# Patient Record
Sex: MALE | Race: ASIAN | NOT HISPANIC OR LATINO
[De-identification: names, ages, dates, MRNs, and addresses within clinical notes are randomized per-mention and may not be internally consistent; named-entity substitution may affect disease eponyms.]

---

## 2020-12-07 ENCOUNTER — TRANSCRIPTION ENCOUNTER (OUTPATIENT)
Age: 45
End: 2020-12-07

## 2021-01-15 ENCOUNTER — TRANSCRIPTION ENCOUNTER (OUTPATIENT)
Age: 46
End: 2021-01-15

## 2021-11-30 ENCOUNTER — TRANSCRIPTION ENCOUNTER (OUTPATIENT)
Age: 46
End: 2021-11-30

## 2022-01-22 ENCOUNTER — TRANSCRIPTION ENCOUNTER (OUTPATIENT)
Age: 47
End: 2022-01-22

## 2022-07-23 ENCOUNTER — NON-APPOINTMENT (OUTPATIENT)
Age: 47
End: 2022-07-23

## 2022-12-15 ENCOUNTER — NON-APPOINTMENT (OUTPATIENT)
Age: 47
End: 2022-12-15

## 2023-02-06 ENCOUNTER — NON-APPOINTMENT (OUTPATIENT)
Age: 48
End: 2023-02-06

## 2024-02-08 ENCOUNTER — INPATIENT (INPATIENT)
Facility: HOSPITAL | Age: 49
LOS: 18 days | Discharge: HOME | End: 2024-02-27
Attending: PSYCHIATRY & NEUROLOGY | Admitting: PSYCHIATRY & NEUROLOGY
Payer: MEDICAID

## 2024-02-08 ENCOUNTER — OUTPATIENT (OUTPATIENT)
Dept: OUTPATIENT SERVICES | Facility: HOSPITAL | Age: 49
LOS: 1 days | Discharge: STILL ADMITTED OR ADMINISTRATIVE | End: 2024-02-08

## 2024-02-08 VITALS
TEMPERATURE: 98 F | OXYGEN SATURATION: 99 % | SYSTOLIC BLOOD PRESSURE: 125 MMHG | HEART RATE: 83 BPM | RESPIRATION RATE: 18 BRPM | DIASTOLIC BLOOD PRESSURE: 91 MMHG

## 2024-02-08 DIAGNOSIS — F10.90 ALCOHOL USE, UNSPECIFIED, UNCOMPLICATED: ICD-10-CM

## 2024-02-08 DIAGNOSIS — F39 UNSPECIFIED MOOD [AFFECTIVE] DISORDER: ICD-10-CM

## 2024-02-08 DIAGNOSIS — F12.90 CANNABIS USE, UNSPECIFIED, UNCOMPLICATED: ICD-10-CM

## 2024-02-08 LAB
ALBUMIN SERPL ELPH-MCNC: 4.2 G/DL — SIGNIFICANT CHANGE UP (ref 3.3–5)
ALP SERPL-CCNC: 68 U/L — SIGNIFICANT CHANGE UP (ref 40–120)
ALT FLD-CCNC: 72 U/L — HIGH (ref 4–41)
AMPHET UR-MCNC: NEGATIVE — SIGNIFICANT CHANGE UP
ANION GAP SERPL CALC-SCNC: 13 MMOL/L — SIGNIFICANT CHANGE UP (ref 7–14)
APAP SERPL-MCNC: <10 UG/ML — LOW (ref 15–25)
APPEARANCE UR: CLEAR — SIGNIFICANT CHANGE UP
AST SERPL-CCNC: 30 U/L — SIGNIFICANT CHANGE UP (ref 4–40)
BARBITURATES UR SCN-MCNC: NEGATIVE — SIGNIFICANT CHANGE UP
BASOPHILS # BLD AUTO: 0.04 K/UL — SIGNIFICANT CHANGE UP (ref 0–0.2)
BASOPHILS NFR BLD AUTO: 0.4 % — SIGNIFICANT CHANGE UP (ref 0–2)
BENZODIAZ UR-MCNC: NEGATIVE — SIGNIFICANT CHANGE UP
BILIRUB SERPL-MCNC: 0.3 MG/DL — SIGNIFICANT CHANGE UP (ref 0.2–1.2)
BILIRUB UR-MCNC: NEGATIVE — SIGNIFICANT CHANGE UP
BUN SERPL-MCNC: 21 MG/DL — SIGNIFICANT CHANGE UP (ref 7–23)
CALCIUM SERPL-MCNC: 9.4 MG/DL — SIGNIFICANT CHANGE UP (ref 8.4–10.5)
CHLORIDE SERPL-SCNC: 102 MMOL/L — SIGNIFICANT CHANGE UP (ref 98–107)
CO2 SERPL-SCNC: 24 MMOL/L — SIGNIFICANT CHANGE UP (ref 22–31)
COCAINE METAB.OTHER UR-MCNC: NEGATIVE — SIGNIFICANT CHANGE UP
COLOR SPEC: YELLOW — SIGNIFICANT CHANGE UP
CREAT SERPL-MCNC: 0.95 MG/DL — SIGNIFICANT CHANGE UP (ref 0.5–1.3)
CREATININE URINE RESULT, DAU: 148 MG/DL — SIGNIFICANT CHANGE UP
DIFF PNL FLD: NEGATIVE — SIGNIFICANT CHANGE UP
EGFR: 100 ML/MIN/1.73M2 — SIGNIFICANT CHANGE UP
EOSINOPHIL # BLD AUTO: 0.08 K/UL — SIGNIFICANT CHANGE UP (ref 0–0.5)
EOSINOPHIL NFR BLD AUTO: 0.7 % — SIGNIFICANT CHANGE UP (ref 0–6)
ETHANOL SERPL-MCNC: <10 MG/DL — SIGNIFICANT CHANGE UP
FLUAV AG NPH QL: SIGNIFICANT CHANGE UP
FLUBV AG NPH QL: SIGNIFICANT CHANGE UP
GLUCOSE SERPL-MCNC: 86 MG/DL — SIGNIFICANT CHANGE UP (ref 70–99)
GLUCOSE UR QL: NEGATIVE MG/DL — SIGNIFICANT CHANGE UP
HCT VFR BLD CALC: 46.6 % — SIGNIFICANT CHANGE UP (ref 39–50)
HGB BLD-MCNC: 15.2 G/DL — SIGNIFICANT CHANGE UP (ref 13–17)
IANC: 7.8 K/UL — HIGH (ref 1.8–7.4)
IMM GRANULOCYTES NFR BLD AUTO: 0.5 % — SIGNIFICANT CHANGE UP (ref 0–0.9)
KETONES UR-MCNC: NEGATIVE MG/DL — SIGNIFICANT CHANGE UP
LEUKOCYTE ESTERASE UR-ACNC: NEGATIVE — SIGNIFICANT CHANGE UP
LYMPHOCYTES # BLD AUTO: 19.6 % — SIGNIFICANT CHANGE UP (ref 13–44)
LYMPHOCYTES # BLD AUTO: 2.1 K/UL — SIGNIFICANT CHANGE UP (ref 1–3.3)
MCHC RBC-ENTMCNC: 25.1 PG — LOW (ref 27–34)
MCHC RBC-ENTMCNC: 32.6 GM/DL — SIGNIFICANT CHANGE UP (ref 32–36)
MCV RBC AUTO: 77 FL — LOW (ref 80–100)
METHADONE UR-MCNC: NEGATIVE — SIGNIFICANT CHANGE UP
MONOCYTES # BLD AUTO: 0.66 K/UL — SIGNIFICANT CHANGE UP (ref 0–0.9)
MONOCYTES NFR BLD AUTO: 6.2 % — SIGNIFICANT CHANGE UP (ref 2–14)
NEUTROPHILS # BLD AUTO: 7.8 K/UL — HIGH (ref 1.8–7.4)
NEUTROPHILS NFR BLD AUTO: 72.6 % — SIGNIFICANT CHANGE UP (ref 43–77)
NITRITE UR-MCNC: NEGATIVE — SIGNIFICANT CHANGE UP
NRBC # BLD: 0 /100 WBCS — SIGNIFICANT CHANGE UP (ref 0–0)
NRBC # FLD: 0 K/UL — SIGNIFICANT CHANGE UP (ref 0–0)
OPIATES UR-MCNC: NEGATIVE — SIGNIFICANT CHANGE UP
OXYCODONE UR-MCNC: NEGATIVE — SIGNIFICANT CHANGE UP
PCP SPEC-MCNC: SIGNIFICANT CHANGE UP
PCP UR-MCNC: NEGATIVE — SIGNIFICANT CHANGE UP
PH UR: 7.5 — SIGNIFICANT CHANGE UP (ref 5–8)
PLATELET # BLD AUTO: 289 K/UL — SIGNIFICANT CHANGE UP (ref 150–400)
POTASSIUM SERPL-MCNC: 4.6 MMOL/L — SIGNIFICANT CHANGE UP (ref 3.5–5.3)
POTASSIUM SERPL-SCNC: 4.6 MMOL/L — SIGNIFICANT CHANGE UP (ref 3.5–5.3)
PROT SERPL-MCNC: 7.3 G/DL — SIGNIFICANT CHANGE UP (ref 6–8.3)
PROT UR-MCNC: NEGATIVE MG/DL — SIGNIFICANT CHANGE UP
RBC # BLD: 6.05 M/UL — HIGH (ref 4.2–5.8)
RBC # FLD: 14.3 % — SIGNIFICANT CHANGE UP (ref 10.3–14.5)
RSV RNA NPH QL NAA+NON-PROBE: SIGNIFICANT CHANGE UP
SALICYLATES SERPL-MCNC: <0.3 MG/DL — LOW (ref 15–30)
SARS-COV-2 RNA SPEC QL NAA+PROBE: SIGNIFICANT CHANGE UP
SODIUM SERPL-SCNC: 139 MMOL/L — SIGNIFICANT CHANGE UP (ref 135–145)
SP GR SPEC: 1.02 — SIGNIFICANT CHANGE UP (ref 1–1.03)
THC UR QL: POSITIVE
TOXICOLOGY SCREEN, DRUGS OF ABUSE, SERUM RESULT: SIGNIFICANT CHANGE UP
TSH SERPL-MCNC: 0.57 UIU/ML — SIGNIFICANT CHANGE UP (ref 0.27–4.2)
UROBILINOGEN FLD QL: 0.2 MG/DL — SIGNIFICANT CHANGE UP (ref 0.2–1)
WBC # BLD: 10.73 K/UL — HIGH (ref 3.8–10.5)
WBC # FLD AUTO: 10.73 K/UL — HIGH (ref 3.8–10.5)

## 2024-02-08 PROCEDURE — G1004: CPT

## 2024-02-08 PROCEDURE — 70450 CT HEAD/BRAIN W/O DYE: CPT | Mod: 26,ME

## 2024-02-08 PROCEDURE — 99285 EMERGENCY DEPT VISIT HI MDM: CPT

## 2024-02-08 RX ORDER — ATORVASTATIN CALCIUM 80 MG/1
20 TABLET, FILM COATED ORAL AT BEDTIME
Refills: 0 | Status: DISCONTINUED | OUTPATIENT
Start: 2024-02-08 | End: 2024-02-27

## 2024-02-08 RX ORDER — THIAMINE MONONITRATE (VIT B1) 100 MG
100 TABLET ORAL DAILY
Refills: 0 | Status: DISCONTINUED | OUTPATIENT
Start: 2024-02-08 | End: 2024-02-23

## 2024-02-08 RX ORDER — CLOPIDOGREL BISULFATE 75 MG/1
75 TABLET, FILM COATED ORAL DAILY
Refills: 0 | Status: DISCONTINUED | OUTPATIENT
Start: 2024-02-08 | End: 2024-02-27

## 2024-02-08 RX ORDER — METOPROLOL TARTRATE 50 MG
25 TABLET ORAL DAILY
Refills: 0 | Status: DISCONTINUED | OUTPATIENT
Start: 2024-02-08 | End: 2024-02-27

## 2024-02-08 RX ORDER — SERTRALINE 25 MG/1
1 TABLET, FILM COATED ORAL
Refills: 0 | DISCHARGE

## 2024-02-08 RX ORDER — INFLUENZA VIRUS VACCINE 15; 15; 15; 15 UG/.5ML; UG/.5ML; UG/.5ML; UG/.5ML
0.5 SUSPENSION INTRAMUSCULAR ONCE
Refills: 0 | Status: DISCONTINUED | OUTPATIENT
Start: 2024-02-08 | End: 2024-02-27

## 2024-02-08 RX ORDER — FOLIC ACID 0.8 MG
1 TABLET ORAL DAILY
Refills: 0 | Status: DISCONTINUED | OUTPATIENT
Start: 2024-02-08 | End: 2024-02-23

## 2024-02-08 NOTE — ED BEHAVIORAL HEALTH ASSESSMENT NOTE - NSPRESENTSXS_PSY_ALL_CORE
Impulsivity/Severe anxiety, agitation or panic ? Psychotic- grandiose/Impulsivity/Severe anxiety, agitation or panic

## 2024-02-08 NOTE — ED BEHAVIORAL HEALTH ASSESSMENT NOTE - ORAL MEDICATION DETAILS
Area H Indication Text: Tumors in this location are included in Area H (eyelids, eyebrows, nose, lips, chin, ear, pre-auricular, post-auricular, temple, genitalia, hands, feet, ankles and areola).  Tissue conservation is critical in these anatomic locations. see above
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
## 2024-02-08 NOTE — ED BEHAVIORAL HEALTH ASSESSMENT NOTE - RISK ASSESSMENT
modifiable risk factors- substance use, agitation/aggression at home, ? grandiosity, poor insight/judgment, insomnia, impulsive bx    protective factors- no suicide attempts, no SI/HI/SIB/intent/plan, no inpatient admissions, no legal issues

## 2024-02-08 NOTE — ED BEHAVIORAL HEALTH ASSESSMENT NOTE - OTHER PAST PSYCHIATRIC HISTORY (INCLUDE DETAILS REGARDING ONSET, COURSE OF ILLNESS, INPATIENT/OUTPATIENT TREATMENT)
No formal psychiatric history or inpatient admissions. No suicide attempts. No current outpatient treaters.

## 2024-02-08 NOTE — BH PATIENT PROFILE - HOME MEDICATIONS
Metoprolol Succinate ER 25 mg oral tablet, extended release , 1 tab(s) orally once a day  atorvastatin 20 mg oral tablet , 1 tab(s) orally once a day (at bedtime)  clopidogrel 75 mg oral tablet , 1 tab(s) orally once a day

## 2024-02-08 NOTE — ED BEHAVIORAL HEALTH ASSESSMENT NOTE - NSACTIVEVENT_PSY_ALL_CORE
Triggering events leading to humiliation, shame, and/or despair (e.g., Loss of relationship, financial or health status) (real or anticipated)/Current or pending social isolation/Pending incarceration or homelessness/Recent onset of psychiatric illness

## 2024-02-08 NOTE — BH PATIENT PROFILE - NSVRISKSCORE_PSY_ALL_CORE
no NLF on rest, first time patient smiled no facial asymmetry, second time some difference but not full effort 2

## 2024-02-08 NOTE — ED BEHAVIORAL HEALTH ASSESSMENT NOTE - VIOLENCE RISK FACTORS:
Violent ideation/threat/speech/Impulsivity/Lack of insight into violence risk/need for treatment/Community stressors that increase the risk of destabilization/Firearm/weapon access

## 2024-02-08 NOTE — ED BEHAVIORAL HEALTH ASSESSMENT NOTE - NSBHSAALC_PSY_A_CORE FT
previously drinking daily 1 350ml bottle of liquor; stated he has not drank since leaving [place] detox but did drink 1 bottle last night 350ml Bicardi rum
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
## 2024-02-08 NOTE — ED BEHAVIORAL HEALTH ASSESSMENT NOTE - CURRENT MEDICATION
Zoloft 50mg Daily, metroprolol ER 50mg, clopidogrel 75mg, atrovastatin 25mg Zoloft 25mg Daily, metroprolol ER 50mg, clopidogrel 75mg, atrovastatin 25mg
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
## 2024-02-08 NOTE — ED BEHAVIORAL HEALTH ASSESSMENT NOTE - DESCRIPTION
congenital heart disease, htn During course of ED visit patient was cooperative. Patient was not aggressive or violent and did not require PRN medications.    ICU Vital Signs Last 24 Hrs  T(C): 36.7 (08 Feb 2024 13:57), Max: 36.7 (08 Feb 2024 13:57)  T(F): 98 (08 Feb 2024 13:57), Max: 98 (08 Feb 2024 13:57)  HR: 83 (08 Feb 2024 13:57) (83 - 83)  BP: 125/91 (08 Feb 2024 13:57) (125/91 - 125/91)  BP(mean): --  ABP: --  ABP(mean): --  RR: 18 (08 Feb 2024 13:57) (18 - 18)  SpO2: 99% (08 Feb 2024 13:57) (99% - 99%)    O2 Parameters below as of 08 Feb 2024 13:57  Patient On (Oxygen Delivery Method): room air 48 year old, recently  from wife and living with family. Previously employed as a ,  BS in Biology and minor in computer science. Born in [facility] During course of ED visit patient was cooperative. Patient was not aggressive or violent . Recommended CIWA to EM    ICU Vital Signs Last 24 Hrs  T(C): 36.7 (08 Feb 2024 13:57), Max: 36.7 (08 Feb 2024 13:57)  T(F): 98 (08 Feb 2024 13:57), Max: 98 (08 Feb 2024 13:57)  HR: 83 (08 Feb 2024 13:57) (83 - 83)  BP: 125/91 (08 Feb 2024 13:57) (125/91 - 125/91)  BP(mean): --  ABP: --  ABP(mean): --  RR: 18 (08 Feb 2024 13:57) (18 - 18)  SpO2: 99% (08 Feb 2024 13:57) (99% - 99%)    O2 Parameters below as of 08 Feb 2024 13:57  Patient On (Oxygen Delivery Method): room air During course of ED visit patient was cooperative. Patient was not aggressive or violent . Recommended CIWA to EM- given ativan 2mg PO at 19:11    ICU Vital Signs Last 24 Hrs  T(C): 36.7 (08 Feb 2024 13:57), Max: 36.7 (08 Feb 2024 13:57)  T(F): 98 (08 Feb 2024 13:57), Max: 98 (08 Feb 2024 13:57)  HR: 83 (08 Feb 2024 13:57) (83 - 83)  BP: 125/91 (08 Feb 2024 13:57) (125/91 - 125/91)  BP(mean): --  ABP: --  ABP(mean): --  RR: 18 (08 Feb 2024 13:57) (18 - 18)  SpO2: 99% (08 Feb 2024 13:57) (99% - 99%)    O2 Parameters below as of 08 Feb 2024 13:57  Patient On (Oxygen Delivery Method): room air

## 2024-02-08 NOTE — ED BEHAVIORAL HEALTH ASSESSMENT NOTE - NSSUICPROTFACT_PSY_ALL_CORE
Responsibility to children, family, or others/Identifies reasons for living/Cultural, spiritual and/or moral attitudes against suicide/Jehovah's witness beliefs
No

## 2024-02-08 NOTE — ED BEHAVIORAL HEALTH ASSESSMENT NOTE - NSBHATTESTAPPAMEND_PSY_A_CORE
I have personally seen and examined this patient. I fully participated in the care of this patient. I have made amendments to the documentation where appropriate and otherwise agree with the history, physical exam, and plan as documented by the TASH
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
## 2024-02-08 NOTE — ED PROVIDER NOTE - NSFOLLOWUPINSTRUCTIONS_ED_ALL_ED_FT
Follow up with your PMD within 48-72 hrs. Show copies of your reports given to you.   Worsening, continued or new concerning symptoms return to the emergency department.    You have been given information necessary to follow up with the  [facility] ([facility]) Crisis center & other outpatient  psychiatric clinics within your community    • [facility] walk in Crisis centre  [address] [place] [address]  [phone] [email or web address]  Hours of operation:  Day	                                        Hours  Sunday                                  Closed  Monday                                9am - 3pm  Tuesday                                9am - 3pm  Wednesday                          9am - 3pm  Thursday                               9am - 3pm  Friday                                    9am - 3pm  Saturday                                Closed

## 2024-02-08 NOTE — ED ADULT TRIAGE NOTE - CHIEF COMPLAINT QUOTE
sent from crisis center, went to seek help but became agitated with staff, calm and cooperative at this time, denies S/I, H/I, H/A, A/H
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
## 2024-02-08 NOTE — BH PATIENT PROFILE - FALL HARM RISK - CONCLUSION
Universal Safety Interventions I personally performed the service described in the documentation recorded by the scribe in my presence, and it accurately and completely records my words and actions.

## 2024-02-08 NOTE — ED BEHAVIORAL HEALTH ASSESSMENT NOTE - PSYCHIATRIC ISSUES AND PLAN (INCLUDE STANDING AND PRN MEDICATION)
defer to inpatient team; Haldol 5mg PO/IM PRN, Ativan 2mg IM PRN, Hydroxyzine HCL 50mg PRN, Benadryl 50mg PO IM PRN stop zoloft- unclear if causing agitation/irritability; defer to inpatient team; Haldol 5mg PO/IM PRN, Ativan 2mg IM PRN, Hydroxyzine HCL 50mg PRN, Benadryl 50mg PO IM PRN
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
## 2024-02-08 NOTE — ED BEHAVIORAL HEALTH ASSESSMENT NOTE - VETERAN
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 Currently Listed for OLT     #Decompensated ETOH/PARNELL Cirrhosis, listed for OLT  #Abdominal collection, infected hematoma s/p IR drainage x 2 with C tube repositioning c/b Augmentin resistant E coli  #Serratia bacteremia 9/11, repeat BCx 9/15 negative  #Hyponatremia  MELD-Na: 24, 9/21  - HE: none currently  - EV: normal esophagus on ERCP from 7/2022  - Ascites: moderate 8/31 US  - SBP: paracentesis 8/9/2022 reveals likely secondary bacterial peritonitis from suspected gallbladder pathology, +SBP on para 8/31  - HCC:  No lesions on CT 8/26      Plan  - Continue lasix  60 mg/aldactone 150  - Check CTAP with IV con, portal venous phase  - F/u ID: long term plan for Abx  - serratia sens to cefepime  - On cefepime, metronidazole  - ID on board, appreciate recs  - ASA 81 daily  - Drain management as per IR RUQ drain output 10ml.24 hours ->removed 9/19  - Repeat CTAP later this week  - Daily CMP, CBC, coags  - Continue Fluconazole  - Physical therapy  [x] Psychosocial: cleared pending RPP  [x ] Infectious  [x] Cardiology:        Cardiac cath: n/a       Stress test: negative noninvasive stress test on 8/19/2022 for inducible ischemia  [ ] Colonoscopy: needed, f/u records from outside GI colonoscopy reports  [x] Prostate: Prostate Specific Antigen: negative at 2.21 ng/mL on 8/12/2022  [x] Dental  [x] PT/Frailty    Preliminary note until signed by Attending.    Thank you for involving us in this patient's care.    Kary Tena MD  Gastroenterology/Hepatology Fellow, PGY-VI    NON-URGENT CONSULTS:  Please email [email or web address]  AT NIGHT AND ON WEEKENDS:  Contact on-call GI fellow via answering service ([phone]) from 5pm-8am and on weekends/holidays  MONDAY-FRIDAY 8AM-5PM:  Pager# [phone] ([facility])  GI Phone# [phone] ([facility])     No

## 2024-02-09 PROCEDURE — 90832 PSYTX W PT 30 MINUTES: CPT | Mod: 59

## 2024-02-09 PROCEDURE — 90853 GROUP PSYCHOTHERAPY: CPT

## 2024-02-09 PROCEDURE — 99222 1ST HOSP IP/OBS MODERATE 55: CPT

## 2024-02-09 RX ORDER — LAMOTRIGINE 25 MG/1
25 TABLET, ORALLY DISINTEGRATING ORAL DAILY
Refills: 0 | Status: DISCONTINUED | OUTPATIENT
Start: 2024-02-09 | End: 2024-02-27

## 2024-02-09 RX ORDER — PHENYLEPHRINE-SHARK LIVER OIL-MINERAL OIL-PETROLATUM RECTAL OINTMENT
1 OINTMENT (GRAM) RECTAL
Refills: 0 | Status: DISCONTINUED | OUTPATIENT
Start: 2024-02-09 | End: 2024-02-27

## 2024-02-09 RX ORDER — POLYETHYLENE GLYCOL 3350 17 G/17G
17 POWDER, FOR SOLUTION ORAL
Refills: 0 | Status: DISCONTINUED | OUTPATIENT
Start: 2024-02-09 | End: 2024-02-27

## 2024-02-09 RX ORDER — OLANZAPINE 15 MG/1
2.5 TABLET, FILM COATED ORAL AT BEDTIME
Refills: 0 | Status: DISCONTINUED | OUTPATIENT
Start: 2024-02-09 | End: 2024-02-11

## 2024-02-09 RX ORDER — LANOLIN ALCOHOL/MO/W.PET/CERES
3 CREAM (GRAM) TOPICAL AT BEDTIME
Refills: 0 | Status: DISCONTINUED | OUTPATIENT
Start: 2024-02-09 | End: 2024-02-27

## 2024-02-09 RX ORDER — HALOPERIDOL DECANOATE 100 MG/ML
5 INJECTION INTRAMUSCULAR EVERY 6 HOURS
Refills: 0 | Status: DISCONTINUED | OUTPATIENT
Start: 2024-02-09 | End: 2024-02-15

## 2024-02-09 RX ADMIN — OLANZAPINE 2.5 MILLIGRAM(S): 15 TABLET, FILM COATED ORAL at 20:35

## 2024-02-09 RX ADMIN — Medication 3 MILLIGRAM(S): at 02:22

## 2024-02-09 RX ADMIN — Medication 100 MILLIGRAM(S): at 09:33

## 2024-02-09 RX ADMIN — Medication 1 MILLIGRAM(S): at 09:32

## 2024-02-09 RX ADMIN — Medication 25 MILLIGRAM(S): at 09:33

## 2024-02-09 RX ADMIN — Medication 1 TABLET(S): at 09:33

## 2024-02-09 RX ADMIN — POLYETHYLENE GLYCOL 3350 17 GRAM(S): 17 POWDER, FOR SOLUTION ORAL at 20:35

## 2024-02-09 RX ADMIN — ATORVASTATIN CALCIUM 20 MILLIGRAM(S): 80 TABLET, FILM COATED ORAL at 20:35

## 2024-02-09 RX ADMIN — CLOPIDOGREL BISULFATE 75 MILLIGRAM(S): 75 TABLET, FILM COATED ORAL at 09:33
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
## 2024-02-09 NOTE — BH INPATIENT PSYCHIATRY ASSESSMENT NOTE - NSDCCRITERIA_PSY_ALL_CORE
Once patient is no longer a threat to himself or others and is able to take care of himself.  When patient is no longer an acute or imminent risk of harm to self or others, and is able to care for self safely
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
## 2024-02-09 NOTE — BH INPATIENT PSYCHIATRY ASSESSMENT NOTE - NSSUICPROTFACT_PSY_ALL_CORE
Responsibility to children, family, or others/Identifies reasons for living/Cultural, spiritual and/or moral attitudes against suicide/Sikh beliefs
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
## 2024-02-09 NOTE — BH INPATIENT PSYCHIATRY ASSESSMENT NOTE - NSCOMMENTSUICRISKFACT_PSY_ALL_CORE
recent stressors include separation from wife and daughter, loss of job, increased alcohol use, and eviction from house

## 2024-02-09 NOTE — BH INPATIENT PSYCHIATRY ASSESSMENT NOTE - MSE UNSTRUCTURED FT
Appearance: well-groomed, dressed appropriately, appears stated age  Behavior: calm and cooperative, no psychomotor agitation or slowing, no abnormal movements  Eye contact: good eye contact  Speech: rapid speech, appropriate volume and tone  Mood: "depressed"  Affect: sad, congruent with mood  Thought process: linear, goal-oriented  Thought content: denies suicidal and homicidal ideation/intent/plan  Perception: denies   Cognition: A&O x3  Insight: good  Judgement: fair  Impulse control: good   Appearance: well-groomed, dressed appropriately, appears stated age  Behavior: calm and cooperative, no psychomotor agitation or slowing, no abnormal movements  Eye contact: good eye contact  Speech: rapid speech, appropriate volume and tone  Mood: "depressed"  Affect: euthymic  Thought process: linear, goal-oriented  Thought content: denies suicidal and homicidal ideation/intent/plan  Perception: denies auditory or visual hallucinations  Cognition: A&O x3  Insight: good  Judgement: fair  Impulse control: good

## 2024-02-09 NOTE — BH INPATIENT PSYCHIATRY ASSESSMENT NOTE - NSBHCHARTREVIEWVS_PSY_A_CORE FT
Vital Signs Last 24 Hrs  T(C): 36.3 (02-09-24 @ 07:44), Max: 36.7 (02-08-24 @ 13:57)  T(F): 97.3 (02-09-24 @ 07:44), Max: 98.1 (02-08-24 @ 20:28)  HR: 61 (02-08-24 @ 20:28) (61 - 83)  BP: 126/83 (02-08-24 @ 20:28) (125/91 - 126/83)  BP(mean): --  RR: 18 (02-08-24 @ 21:31) (17 - 18)  SpO2: 99% (02-08-24 @ 21:31) (99% - 99%)    Orthostatic VS  02-09-24 @ 07:44  Lying BP: --/-- HR: --  Sitting BP: 122/78 HR: 60  Standing BP: 125/88 HR: 61  Site: --  Mode: --  Orthostatic VS  02-08-24 @ 21:31  Lying BP: --/-- HR: --  Sitting BP: 127/86 HR: 66  Standing BP: 126/76 HR: 64  Site: --  Mode: --   Vital Signs Last 24 Hrs  T(C): 36.3 (02-09-24 @ 07:44), Max: 36.7 (02-08-24 @ 20:28)  T(F): 97.3 (02-09-24 @ 07:44), Max: 98.1 (02-08-24 @ 20:28)  HR: 61 (02-08-24 @ 20:28) (61 - 61)  BP: 126/83 (02-08-24 @ 20:28) (126/83 - 126/83)  BP(mean): --  RR: 18 (02-08-24 @ 21:31) (17 - 18)  SpO2: 99% (02-08-24 @ 21:31) (99% - 99%)    Orthostatic VS  02-09-24 @ 07:44  Lying BP: --/-- HR: --  Sitting BP: 122/78 HR: 60  Standing BP: 125/88 HR: 61  Site: --  Mode: --  Orthostatic VS  02-08-24 @ 21:31  Lying BP: --/-- HR: --  Sitting BP: 127/86 HR: 66  Standing BP: 126/76 HR: 64  Site: --  Mode: --

## 2024-02-09 NOTE — BH INPATIENT PSYCHIATRY ASSESSMENT NOTE - NSBHMETABOLIC_PSY_ALL_CORE_FT
BMI: BMI (kg/m2): 25.8 (02-08-24 @ 21:31)  HbA1c:   Glucose:   BP: 126/83 (02-08-24 @ 20:28) (125/91 - 126/83)Vital Signs Last 24 Hrs  T(C): 36.3 (02-09-24 @ 07:44), Max: 36.7 (02-08-24 @ 13:57)  T(F): 97.3 (02-09-24 @ 07:44), Max: 98.1 (02-08-24 @ 20:28)  HR: 61 (02-08-24 @ 20:28) (61 - 83)  BP: 126/83 (02-08-24 @ 20:28) (125/91 - 126/83)  BP(mean): --  RR: 18 (02-08-24 @ 21:31) (17 - 18)  SpO2: 99% (02-08-24 @ 21:31) (99% - 99%)    Orthostatic VS  02-09-24 @ 07:44  Lying BP: --/-- HR: --  Sitting BP: 122/78 HR: 60  Standing BP: 125/88 HR: 61  Site: --  Mode: --  Orthostatic VS  02-08-24 @ 21:31  Lying BP: --/-- HR: --  Sitting BP: 127/86 HR: 66  Standing BP: 126/76 HR: 64  Site: --  Mode: --    Lipid Panel:  BMI: BMI (kg/m2): 25.8 (02-08-24 @ 21:31)  HbA1c:   Glucose:   BP: 126/83 (02-08-24 @ 20:28) (125/91 - 126/83)Vital Signs Last 24 Hrs  T(C): 36.3 (02-09-24 @ 07:44), Max: 36.7 (02-08-24 @ 20:28)  T(F): 97.3 (02-09-24 @ 07:44), Max: 98.1 (02-08-24 @ 20:28)  HR: 61 (02-08-24 @ 20:28) (61 - 61)  BP: 126/83 (02-08-24 @ 20:28) (126/83 - 126/83)  BP(mean): --  RR: 18 (02-08-24 @ 21:31) (17 - 18)  SpO2: 99% (02-08-24 @ 21:31) (99% - 99%)    Orthostatic VS  02-09-24 @ 07:44  Lying BP: --/-- HR: --  Sitting BP: 122/78 HR: 60  Standing BP: 125/88 HR: 61  Site: --  Mode: --  Orthostatic VS  02-08-24 @ 21:31  Lying BP: --/-- HR: --  Sitting BP: 127/86 HR: 66  Standing BP: 126/76 HR: 64  Site: --  Mode: --    Lipid Panel:

## 2024-02-09 NOTE — BH SOCIAL WORK INITIAL PSYCHOSOCIAL EVALUATION - NSBHTREATHXNAMEFT_PSY_ALL_CORE
[facility] and residential program in [place], [place].

## 2024-02-09 NOTE — BH SOCIAL WORK INITIAL PSYCHOSOCIAL EVALUATION - NSBHCOMMOTHER_PSY_ALL_CORE
----- Message from Rosana Nelson MD sent at 7/1/2019  5:07 PM CDT -----  In re to lipids how about Zetia ? It would not cause muscular problems  
Upcoming appt will discuss then.  
None

## 2024-02-09 NOTE — BH INPATIENT PSYCHIATRY ASSESSMENT NOTE - DESCRIPTION
48 year old, recently  from wife and living with family. Previously employed as a ,  BS in Biology and minor in computer science. Born in [facility]

## 2024-02-09 NOTE — BH SOCIAL WORK INITIAL PSYCHOSOCIAL EVALUATION - NSBHSAALC_PSY_A_CORE FT
previously drinking daily 1 350ml bottle of liquor; stated he has not drank since leaving [place] detox but did drink 1 bottle last night 350ml Bicardi rum Previously drinking daily 1 350ml bottle of liquor; stated he has not drank since leaving [place] detox but did drink 1 bottle of 350ml Bacardi rum 2 days ago.

## 2024-02-09 NOTE — BH INPATIENT PSYCHIATRY ASSESSMENT NOTE - NSBHCRANIAL_PSY_ALL_CORE
Recognizes 2 fingers or can read (II)/Smiles, shows teeth, opens mouth, sticks out tongue (V, VII, XI)/Extraocular Eye Movement Intact  (III, IV, VI)/Shoulder shrug (XI)/Hearing intact (VIII)
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
## 2024-02-09 NOTE — BH SOCIAL WORK INITIAL PSYCHOSOCIAL EVALUATION - NSBHHOUSECOMMENTFT_PSY_ALL_CORE
Patient was previously residing with family, but is now unable to return to residence. Currently homeless and seeking other options.

## 2024-02-09 NOTE — BH SOCIAL WORK INITIAL PSYCHOSOCIAL EVALUATION - NSPTSTATEDGOAL_PSY_ALL_CORE
"To get back on track, heal, become stable, let go of anger. I want to gain more patience and have a fresh start. Eventually, I hope to get an apartment to live on my own, maybe get an online masters. I also want to do kickboxing, music composing, and get a job that is less stressful". "To get back on track, heal, become stable, let go of anger. I want to gain more patience and have a fresh start. Eventually, I hope to get an apartment to live on my own, maybe get an online masters. I also want to do kickboxing, music composing, and get a job that is less stressful."

## 2024-02-09 NOTE — BH SOCIAL WORK INITIAL PSYCHOSOCIAL EVALUATION - NSBHFINANCESOCIAL_PSY_ALL_CORE
Patient needs help applying for medicaid./Other (specify) Patient needs help applying for Medicaid./Other (specify)

## 2024-02-10 LAB
A1C WITH ESTIMATED AVERAGE GLUCOSE RESULT: 5.5 % — SIGNIFICANT CHANGE UP (ref 4–5.6)
CHOLEST SERPL-MCNC: 188 MG/DL — SIGNIFICANT CHANGE UP
ESTIMATED AVERAGE GLUCOSE: 111 — SIGNIFICANT CHANGE UP
HDLC SERPL-MCNC: 55 MG/DL — SIGNIFICANT CHANGE UP
LIPID PNL WITH DIRECT LDL SERPL: 88 MG/DL — SIGNIFICANT CHANGE UP
NON HDL CHOLESTEROL: 133 MG/DL — HIGH
TRIGL SERPL-MCNC: 225 MG/DL — HIGH

## 2024-02-10 PROCEDURE — 99232 SBSQ HOSP IP/OBS MODERATE 35: CPT

## 2024-02-10 RX ADMIN — Medication 25 MILLIGRAM(S): at 09:02

## 2024-02-10 RX ADMIN — Medication 1 TABLET(S): at 09:02

## 2024-02-10 RX ADMIN — Medication 100 MILLIGRAM(S): at 09:03

## 2024-02-10 RX ADMIN — ATORVASTATIN CALCIUM 20 MILLIGRAM(S): 80 TABLET, FILM COATED ORAL at 20:37

## 2024-02-10 RX ADMIN — POLYETHYLENE GLYCOL 3350 17 GRAM(S): 17 POWDER, FOR SOLUTION ORAL at 09:03

## 2024-02-10 RX ADMIN — LAMOTRIGINE 25 MILLIGRAM(S): 25 TABLET, ORALLY DISINTEGRATING ORAL at 09:01

## 2024-02-10 RX ADMIN — Medication 1 MILLIGRAM(S): at 09:02

## 2024-02-10 RX ADMIN — CLOPIDOGREL BISULFATE 75 MILLIGRAM(S): 75 TABLET, FILM COATED ORAL at 09:02

## 2024-02-10 RX ADMIN — OLANZAPINE 2.5 MILLIGRAM(S): 15 TABLET, FILM COATED ORAL at 20:37

## 2024-02-10 NOTE — BH INPATIENT PSYCHIATRY PROGRESS NOTE - PRN MEDS
MEDICATIONS  (PRN):  haloperidol     Tablet 5 milliGRAM(s) Oral every 6 hours PRN agitation  hemorrhoidal Ointment 1 Application(s) Rectal four times a day PRN hemorrhoids  LORazepam     Tablet 2 milliGRAM(s) Oral every 2 hours PRN CIWA score increase by 2 points and current CIWA score GREATER THAN 9  LORazepam     Tablet 2 milliGRAM(s) Oral every 6 hours PRN agitation  melatonin. 3 milliGRAM(s) Oral at bedtime PRN Insomnia
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
## 2024-02-10 NOTE — BH INPATIENT PSYCHIATRY PROGRESS NOTE - MSE UNSTRUCTURED FT
Appearance: well-groomed, dressed appropriately, appears stated age  Behavior: calm and cooperative, no psychomotor agitation or slowing, no abnormal movements  Eye contact: good eye contact  Speech: rapid speech, appropriate volume and tone  Mood: "anxious"  Affect: anxious, constricted  Thought process: linear, goal-oriented  Thought content: denies suicidal and homicidal ideation/intent/plan  Perception: denies auditory or visual hallucinations  Cognition: A&O x3  Insight: fair - improving   Judgement: fair  Impulse control: good

## 2024-02-10 NOTE — BH INPATIENT PSYCHIATRY PROGRESS NOTE - CURRENT MEDICATION
MEDICATIONS  (STANDING):  atorvastatin 20 milliGRAM(s) Oral at bedtime  clopidogrel Tablet 75 milliGRAM(s) Oral daily  folic acid 1 milliGRAM(s) Oral daily  influenza   Vaccine 0.5 milliLiter(s) IntraMuscular once  lamoTRIgine 25 milliGRAM(s) Oral daily  metoprolol succinate ER 25 milliGRAM(s) Oral daily  multivitamin 1 Tablet(s) Oral daily  OLANZapine 2.5 milliGRAM(s) Oral at bedtime  polyethylene glycol 3350 17 Gram(s) Oral two times a day  thiamine 100 milliGRAM(s) Oral daily    MEDICATIONS  (PRN):  haloperidol     Tablet 5 milliGRAM(s) Oral every 6 hours PRN agitation  hemorrhoidal Ointment 1 Application(s) Rectal four times a day PRN hemorrhoids  LORazepam     Tablet 2 milliGRAM(s) Oral every 2 hours PRN CIWA score increase by 2 points and current CIWA score GREATER THAN 9  LORazepam     Tablet 2 milliGRAM(s) Oral every 6 hours PRN agitation  melatonin. 3 milliGRAM(s) Oral at bedtime PRN Insomnia
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
## 2024-02-11 PROCEDURE — 99232 SBSQ HOSP IP/OBS MODERATE 35: CPT

## 2024-02-11 RX ORDER — OLANZAPINE 15 MG/1
5 TABLET, FILM COATED ORAL AT BEDTIME
Refills: 0 | Status: DISCONTINUED | OUTPATIENT
Start: 2024-02-11 | End: 2024-02-27

## 2024-02-11 RX ADMIN — Medication 1 MILLIGRAM(S): at 09:06

## 2024-02-11 RX ADMIN — POLYETHYLENE GLYCOL 3350 17 GRAM(S): 17 POWDER, FOR SOLUTION ORAL at 20:26

## 2024-02-11 RX ADMIN — ATORVASTATIN CALCIUM 20 MILLIGRAM(S): 80 TABLET, FILM COATED ORAL at 20:27

## 2024-02-11 RX ADMIN — LAMOTRIGINE 25 MILLIGRAM(S): 25 TABLET, ORALLY DISINTEGRATING ORAL at 09:07

## 2024-02-11 RX ADMIN — OLANZAPINE 5 MILLIGRAM(S): 15 TABLET, FILM COATED ORAL at 20:27

## 2024-02-11 RX ADMIN — Medication 25 MILLIGRAM(S): at 09:07

## 2024-02-11 RX ADMIN — POLYETHYLENE GLYCOL 3350 17 GRAM(S): 17 POWDER, FOR SOLUTION ORAL at 09:17

## 2024-02-11 RX ADMIN — CLOPIDOGREL BISULFATE 75 MILLIGRAM(S): 75 TABLET, FILM COATED ORAL at 09:06

## 2024-02-11 RX ADMIN — Medication 1 TABLET(S): at 09:07

## 2024-02-11 NOTE — BH INPATIENT PSYCHIATRY PROGRESS NOTE - NSBHFUPINTERVALHXFT_PSY_A_CORE
Chart reviewed. Discussed with Nursing staff. Did not require any emergent PRNS.   Pt reports he's feeling better this morning. He has been able to speak with his wife but she's not yet ready to come visit him. He continues to report some irritable and anxiety and admits he did not sleep well (woke up at 1am, 3am). Zyprexa to be increased to 5mg po qhs - to help with mood stability and sleep.   No si/i/p  no evidence of psychosis

## 2024-02-11 NOTE — BH INPATIENT PSYCHIATRY PROGRESS NOTE - MSE UNSTRUCTURED FT
Appearance: well-groomed, dressed appropriately, appears stated age  Behavior: calm and cooperative, no psychomotor agitation or slowing, no abnormal movements  Eye contact: good eye contact  Speech: normally paced, appropriate volume and tone  Mood: "okay"  Affect: anxious, constricted  Thought process: linear, goal-oriented  Thought content: denies suicidal and homicidal ideation/intent/plan, wants to reconcile with wife  Perception: denies auditory or visual hallucinations  Cognition: A&O x3  Insight: fair - improving   Judgement: fair  Impulse control: good

## 2024-02-11 NOTE — BH INPATIENT PSYCHIATRY PROGRESS NOTE - CURRENT MEDICATION
MEDICATIONS  (STANDING):  atorvastatin 20 milliGRAM(s) Oral at bedtime  clopidogrel Tablet 75 milliGRAM(s) Oral daily  folic acid 1 milliGRAM(s) Oral daily  influenza   Vaccine 0.5 milliLiter(s) IntraMuscular once  lamoTRIgine 25 milliGRAM(s) Oral daily  metoprolol succinate ER 25 milliGRAM(s) Oral daily  multivitamin 1 Tablet(s) Oral daily  OLANZapine 5 milliGRAM(s) Oral at bedtime  polyethylene glycol 3350 17 Gram(s) Oral two times a day  thiamine 100 milliGRAM(s) Oral daily    MEDICATIONS  (PRN):  haloperidol     Tablet 5 milliGRAM(s) Oral every 6 hours PRN agitation  hemorrhoidal Ointment 1 Application(s) Rectal four times a day PRN hemorrhoids  LORazepam     Tablet 2 milliGRAM(s) Oral every 2 hours PRN CIWA score increase by 2 points and current CIWA score GREATER THAN 9  LORazepam     Tablet 2 milliGRAM(s) Oral every 6 hours PRN agitation  melatonin. 3 milliGRAM(s) Oral at bedtime PRN Insomnia
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
## 2024-02-12 PROCEDURE — 90832 PSYTX W PT 30 MINUTES: CPT

## 2024-02-12 PROCEDURE — 99232 SBSQ HOSP IP/OBS MODERATE 35: CPT

## 2024-02-12 RX ORDER — LIDOCAINE 4 G/100G
1 CREAM TOPICAL
Refills: 0 | Status: DISCONTINUED | OUTPATIENT
Start: 2024-02-12 | End: 2024-02-27

## 2024-02-12 RX ADMIN — Medication 1 TABLET(S): at 09:03

## 2024-02-12 RX ADMIN — POLYETHYLENE GLYCOL 3350 17 GRAM(S): 17 POWDER, FOR SOLUTION ORAL at 20:45

## 2024-02-12 RX ADMIN — Medication 25 MILLIGRAM(S): at 09:03

## 2024-02-12 RX ADMIN — CLOPIDOGREL BISULFATE 75 MILLIGRAM(S): 75 TABLET, FILM COATED ORAL at 09:03

## 2024-02-12 RX ADMIN — ATORVASTATIN CALCIUM 20 MILLIGRAM(S): 80 TABLET, FILM COATED ORAL at 20:32

## 2024-02-12 RX ADMIN — Medication 1 MILLIGRAM(S): at 09:03

## 2024-02-12 RX ADMIN — OLANZAPINE 5 MILLIGRAM(S): 15 TABLET, FILM COATED ORAL at 20:32

## 2024-02-12 RX ADMIN — LIDOCAINE 1 APPLICATION(S): 4 CREAM TOPICAL at 20:45

## 2024-02-12 RX ADMIN — PHENYLEPHRINE-SHARK LIVER OIL-MINERAL OIL-PETROLATUM RECTAL OINTMENT 1 APPLICATION(S): at 10:11

## 2024-02-12 RX ADMIN — LAMOTRIGINE 25 MILLIGRAM(S): 25 TABLET, ORALLY DISINTEGRATING ORAL at 09:04

## 2024-02-12 NOTE — BH INPATIENT PSYCHIATRY PROGRESS NOTE - PRN MEDS
MEDICATIONS  (PRN):  haloperidol     Tablet 5 milliGRAM(s) Oral every 6 hours PRN agitation  hemorrhoidal Ointment 1 Application(s) Rectal four times a day PRN hemorrhoids  LORazepam     Tablet 2 milliGRAM(s) Oral every 2 hours PRN CIWA score increase by 2 points and current CIWA score GREATER THAN 9  LORazepam     Tablet 2 milliGRAM(s) Oral every 6 hours PRN agitation  melatonin. 3 milliGRAM(s) Oral at bedtime PRN Insomnia   MEDICATIONS  (PRN):  haloperidol     Tablet 5 milliGRAM(s) Oral every 6 hours PRN agitation  hemorrhoidal Ointment 1 Application(s) Rectal four times a day PRN hemorrhoids  LORazepam     Tablet 2 milliGRAM(s) Oral every 6 hours PRN agitation  LORazepam     Tablet 2 milliGRAM(s) Oral every 2 hours PRN CIWA score increase by 2 points and current CIWA score GREATER THAN 9  melatonin. 3 milliGRAM(s) Oral at bedtime PRN Insomnia
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
## 2024-02-12 NOTE — BH INPATIENT PSYCHIATRY PROGRESS NOTE - MSE UNSTRUCTURED FT
Appearance: well-groomed, dressed appropriately, appears stated age  Behavior: calm and cooperative, no psychomotor agitation or slowing, no abnormal movements  Eye contact: good eye contact  Speech: overinclusive, normally paced, appropriate volume and tone  Mood: "okay," "7 out of 10"  Affect: anxious, constricted, stable  Thought process: linear, goal-oriented  Thought content: denies suicidal and homicidal ideation/intent/plan, wants to reconcile with wife  Perception: denies auditory or visual hallucinations  Cognition: A&O x3  Insight: fair - improving   Judgement: fair  Impulse control: good   Appearance: well-groomed, dressed appropriately, appears stated age  Behavior: calm and cooperative, no psychomotor agitation or slowing, no abnormal movements  Eye contact: good eye contact  Speech: overinclusive, normally paced, appropriate volume and tone  Mood: "okay," "7 out of 10"  Affect: anxious, constricted, stable  Thought process: linear, goal-oriented  Thought content: denies suicidal and homicidal ideation/intent/plan, wants to reconcile with wife  Perception: denies auditory or visual hallucinations  Cognition: A&O x3, fair attention, language fluent  Insight: fair - improving   Judgement: fair  Impulse control: good

## 2024-02-12 NOTE — BH INPATIENT PSYCHIATRY PROGRESS NOTE - NSBHFUPINTERVALHXFT_PSY_A_CORE
Pt reports he's feeling better this morning. His mother and wife were able to visit him over the weekend. He reports that he did not sleep well - about 5 hours and waking up throughout the night. He reports that the olanzapine makes him feel "groggy" when he wakes up in the morning. Appetite is good. He reports some constipation (chronic history of hemorrhoids) that is improved with miralax.  No si/i/p  no evidence of psychosis  Pt reports he's feeling better this morning. His mother and wife were able to visit him over the weekend. He reports that he did not sleep well - about 5 hours and waking up throughout the night. He reports that the olanzapine makes him feel "groggy" when he wakes up in the morning. He expressed some concern about gaining weight and wants to "keep [his] BMI normal."  Appetite is good. He reports some constipation (chronic history of hemorrhoids) that is improved with miralax.  No si/i/p  no evidence of psychosis  Pt reports he's feeling better this morning. His mother and wife were able to visit him over the weekend. He reports that he did not sleep well - about 5 hours and waking up throughout the night. He reports that the olanzapine makes him feel "groggy" when he wakes up in the morning. He expressed some concern about gaining weight and wants to "keep [his] BMI normal."  Appetite is good. He reports some constipation (chronic history of hemorrhoids) that is improved with miralax.

## 2024-02-12 NOTE — BH INPATIENT PSYCHIATRY PROGRESS NOTE - CURRENT MEDICATION
MEDICATIONS  (STANDING):  atorvastatin 20 milliGRAM(s) Oral at bedtime  clopidogrel Tablet 75 milliGRAM(s) Oral daily  folic acid 1 milliGRAM(s) Oral daily  influenza   Vaccine 0.5 milliLiter(s) IntraMuscular once  lamoTRIgine 25 milliGRAM(s) Oral daily  metoprolol succinate ER 25 milliGRAM(s) Oral daily  multivitamin 1 Tablet(s) Oral daily  OLANZapine 5 milliGRAM(s) Oral at bedtime  polyethylene glycol 3350 17 Gram(s) Oral two times a day  thiamine 100 milliGRAM(s) Oral daily    MEDICATIONS  (PRN):  haloperidol     Tablet 5 milliGRAM(s) Oral every 6 hours PRN agitation  hemorrhoidal Ointment 1 Application(s) Rectal four times a day PRN hemorrhoids  LORazepam     Tablet 2 milliGRAM(s) Oral every 2 hours PRN CIWA score increase by 2 points and current CIWA score GREATER THAN 9  LORazepam     Tablet 2 milliGRAM(s) Oral every 6 hours PRN agitation  melatonin. 3 milliGRAM(s) Oral at bedtime PRN Insomnia   MEDICATIONS  (STANDING):  atorvastatin 20 milliGRAM(s) Oral at bedtime  clopidogrel Tablet 75 milliGRAM(s) Oral daily  folic acid 1 milliGRAM(s) Oral daily  influenza   Vaccine 0.5 milliLiter(s) IntraMuscular once  lamoTRIgine 25 milliGRAM(s) Oral daily  metoprolol succinate ER 25 milliGRAM(s) Oral daily  multivitamin 1 Tablet(s) Oral daily  OLANZapine 5 milliGRAM(s) Oral at bedtime  polyethylene glycol 3350 17 Gram(s) Oral two times a day  thiamine 100 milliGRAM(s) Oral daily    MEDICATIONS  (PRN):  haloperidol     Tablet 5 milliGRAM(s) Oral every 6 hours PRN agitation  hemorrhoidal Ointment 1 Application(s) Rectal four times a day PRN hemorrhoids  LORazepam     Tablet 2 milliGRAM(s) Oral every 6 hours PRN agitation  LORazepam     Tablet 2 milliGRAM(s) Oral every 2 hours PRN CIWA score increase by 2 points and current CIWA score GREATER THAN 9  melatonin. 3 milliGRAM(s) Oral at bedtime PRN Insomnia

## 2024-02-13 PROCEDURE — 99232 SBSQ HOSP IP/OBS MODERATE 35: CPT

## 2024-02-13 RX ORDER — IBUPROFEN 200 MG
400 TABLET ORAL EVERY 6 HOURS
Refills: 0 | Status: DISCONTINUED | OUTPATIENT
Start: 2024-02-13 | End: 2024-02-27

## 2024-02-13 RX ADMIN — Medication 400 MILLIGRAM(S): at 11:30

## 2024-02-13 RX ADMIN — Medication 1 TABLET(S): at 08:13

## 2024-02-13 RX ADMIN — OLANZAPINE 5 MILLIGRAM(S): 15 TABLET, FILM COATED ORAL at 21:08

## 2024-02-13 RX ADMIN — LAMOTRIGINE 25 MILLIGRAM(S): 25 TABLET, ORALLY DISINTEGRATING ORAL at 08:13

## 2024-02-13 RX ADMIN — ATORVASTATIN CALCIUM 20 MILLIGRAM(S): 80 TABLET, FILM COATED ORAL at 21:08

## 2024-02-13 RX ADMIN — POLYETHYLENE GLYCOL 3350 17 GRAM(S): 17 POWDER, FOR SOLUTION ORAL at 08:15

## 2024-02-13 RX ADMIN — Medication 1 MILLIGRAM(S): at 08:13

## 2024-02-13 RX ADMIN — POLYETHYLENE GLYCOL 3350 17 GRAM(S): 17 POWDER, FOR SOLUTION ORAL at 21:08

## 2024-02-13 RX ADMIN — CLOPIDOGREL BISULFATE 75 MILLIGRAM(S): 75 TABLET, FILM COATED ORAL at 09:53

## 2024-02-13 RX ADMIN — Medication 25 MILLIGRAM(S): at 08:13

## 2024-02-13 NOTE — BH INPATIENT PSYCHIATRY PROGRESS NOTE - NSBHFUPINTERVALHXFT_PSY_A_CORE
Pt states he is in agreement with returning to inpatient rehab and hopefully going to sober home afterwards.

## 2024-02-13 NOTE — BH INPATIENT PSYCHIATRY PROGRESS NOTE - CURRENT MEDICATION
MEDICATIONS  (STANDING):  atorvastatin 20 milliGRAM(s) Oral at bedtime  clopidogrel Tablet 75 milliGRAM(s) Oral daily  folic acid 1 milliGRAM(s) Oral daily  influenza   Vaccine 0.5 milliLiter(s) IntraMuscular once  lamoTRIgine 25 milliGRAM(s) Oral daily  metoprolol succinate ER 25 milliGRAM(s) Oral daily  multivitamin 1 Tablet(s) Oral daily  OLANZapine 5 milliGRAM(s) Oral at bedtime  polyethylene glycol 3350 17 Gram(s) Oral two times a day  thiamine 100 milliGRAM(s) Oral daily    MEDICATIONS  (PRN):  haloperidol     Tablet 5 milliGRAM(s) Oral every 6 hours PRN agitation  hemorrhoidal Ointment 1 Application(s) Rectal four times a day PRN hemorrhoids  ibuprofen  Tablet. 400 milliGRAM(s) Oral every 6 hours PRN Moderate Pain (4 - 6)  lidocaine 5% Ointment 1 Application(s) Topical four times a day PRN rectal pain  LORazepam     Tablet 2 milliGRAM(s) Oral every 6 hours PRN agitation  melatonin. 3 milliGRAM(s) Oral at bedtime PRN Insomnia

## 2024-02-13 NOTE — BH INPATIENT PSYCHIATRY PROGRESS NOTE - PRN MEDS
MEDICATIONS  (PRN):  haloperidol     Tablet 5 milliGRAM(s) Oral every 6 hours PRN agitation  hemorrhoidal Ointment 1 Application(s) Rectal four times a day PRN hemorrhoids  ibuprofen  Tablet. 400 milliGRAM(s) Oral every 6 hours PRN Moderate Pain (4 - 6)  lidocaine 5% Ointment 1 Application(s) Topical four times a day PRN rectal pain  LORazepam     Tablet 2 milliGRAM(s) Oral every 6 hours PRN agitation  melatonin. 3 milliGRAM(s) Oral at bedtime PRN Insomnia

## 2024-02-13 NOTE — BH INPATIENT PSYCHIATRY PROGRESS NOTE - NSBHASSESSSUMMFT_PSY_ALL_CORE
Patient is 48 years old M,  from wife since 11/23, has 15 year daughter who lives with wife, unemployed after being let go from job at , has been living with mother and brother last 3 months, currently evicted. No formal psychiatric history or inpatient admissions. No suicide attempts. + history of substance use- alcohol and cannabis use. Last drank a bottle of 350ml Bacardi yesterday. He was recently discharged from detox in [place] 3 weeks ago. PMH of congenital heart disease, HTN. BIBA referred from crisis center for disorganization and reported aggression at home, admitted to [facility] 2N for concern for bipolar disorder vs substance induced mood disorder.  Likely the latter, given rapid reconstitution on relatively low doses of psychotropic.    Pt at this time does not exhibit any mood or psychosis symptoms.      Plan:   Legal: 9.39  Safety: routine observation. Denies SI/HI/I/P on unit.   	PRn: haldol 5 mg/Ativan 2 mg/Benadryl 50 mg IM/PO prn for agitation  Psychiatry: continue olanzapine and lamotrigine.  Group, milieu, individual therapy as appropriate.  Medical: PMH of congenital heart disease:   	continue with metoprolol succinate ER 25 mg PO/ clopidogrel 75 mg PO/ atorvastatin 20 mg PO  	melatonin 3 mg at bedtime PRN for insomnia  	miralax BID  Dispo: with clinical improvement. Patient continues to require inpatient hospitalization for stabilization and safety. When patient is no longer an acute or imminent risk of harm to self or others, and is able to care for self safely, patient may be discharged.

## 2024-02-13 NOTE — BH INPATIENT PSYCHIATRY PROGRESS NOTE - MSE UNSTRUCTURED FT
Appearance: well-groomed, dressed appropriately, appears stated age, good hygiene and grooming  Behavior: calm and cooperative, no psychomotor agitation or slowing, no abnormal movements  Eye contact: good eye contact  Speech: regular rate, normal prosody  Mood: "ok"  Affect: Mildly anxious.  Thought process: linear, goal-oriented  Thought content: Future oriented.  Denies SIIP or HIIP.  Perception: denies auditory or visual hallucinations  Cognition: A&O x3, fair attention, language fluent  Insight: fair - improving   Judgement: fair  Impulse control: good

## 2024-02-14 PROCEDURE — 99231 SBSQ HOSP IP/OBS SF/LOW 25: CPT

## 2024-02-14 PROCEDURE — 90853 GROUP PSYCHOTHERAPY: CPT

## 2024-02-14 RX ADMIN — CLOPIDOGREL BISULFATE 75 MILLIGRAM(S): 75 TABLET, FILM COATED ORAL at 08:53

## 2024-02-14 RX ADMIN — ATORVASTATIN CALCIUM 20 MILLIGRAM(S): 80 TABLET, FILM COATED ORAL at 20:26

## 2024-02-14 RX ADMIN — Medication 1 MILLIGRAM(S): at 08:53

## 2024-02-14 RX ADMIN — POLYETHYLENE GLYCOL 3350 17 GRAM(S): 17 POWDER, FOR SOLUTION ORAL at 08:55

## 2024-02-14 RX ADMIN — Medication 25 MILLIGRAM(S): at 08:53

## 2024-02-14 RX ADMIN — Medication 1 TABLET(S): at 08:53

## 2024-02-14 RX ADMIN — LAMOTRIGINE 25 MILLIGRAM(S): 25 TABLET, ORALLY DISINTEGRATING ORAL at 08:53

## 2024-02-14 RX ADMIN — OLANZAPINE 5 MILLIGRAM(S): 15 TABLET, FILM COATED ORAL at 20:26

## 2024-02-14 NOTE — BH INPATIENT PSYCHIATRY PROGRESS NOTE - MSE UNSTRUCTURED FT
Appearance: well-groomed, dressed appropriately, appears stated age, good hygiene and grooming  Behavior: calm and cooperative, no psychomotor agitation or slowing, no abnormal movements  Eye contact: good eye contact  Speech: regular rate, normal prosody  Mood: "pretty good"  Affect: Mildly anxious.  Thought process: linear, goal-oriented  Thought content: Future oriented.  Denies SIIP or HIIP.  Perception: denies auditory or visual hallucinations  Cognition: A&O x3, fair attention, language fluent  Insight: fair - improving   Judgement: fair  Impulse control: good

## 2024-02-14 NOTE — BH INPATIENT PSYCHIATRY PROGRESS NOTE - NSBHFUPINTERVALHXFT_PSY_A_CORE
Patient states that he feels much better and is using anger management methods he learned in group sessions. He is sleeping more and has a good appetite. He reports some soreness in his knee which is improved with ibuprofen.

## 2024-02-14 NOTE — BH INPATIENT PSYCHIATRY PROGRESS NOTE - NSBHASSESSSUMMFT_PSY_ALL_CORE
Patient is 48 years old M,  from wife since 11/23, has 15 year daughter who lives with wife, unemployed after being let go from job at , has been living with mother and brother last 3 months, currently evicted. No formal psychiatric history or inpatient admissions. No suicide attempts. + history of substance use- alcohol and cannabis use. Last drank a bottle of 350ml Bacardi yesterday. He was recently discharged from detox in [place] 3 weeks ago. PMH of congenital heart disease, HTN. BIBA referred from crisis center for disorganization and reported aggression at home, admitted to [facility] 2N for concern for bipolar disorder vs substance induced mood disorder.  Likely the latter, given rapid reconstitution on relatively low doses of psychotropic.    Pt at this time does not exhibit any mood or psychosis symptoms.      Plan:   Legal: 9.39  Safety: routine observation. Denies SI/HI/I/P on unit.   	PRn: haldol 5 mg/Ativan 2 mg/Benadryl 50 mg IM/PO prn for agitation  Psychiatry: continue olanzapine and lamotrigine.  Group, milieu, individual therapy as appropriate.  Medical: PMH of congenital heart disease:   	continue with metoprolol succinate ER 25 mg PO/ clopidogrel 75 mg PO/ atorvastatin 20 mg PO  	melatonin 3 mg at bedtime PRN for insomnia  	miralax BID  Dispo: with clinical improvement. Patient continues to require inpatient hospitalization for stabilization and safety. When patient is no longer an acute or imminent risk of harm to self or others, and is able to care for self safely, patient may be discharged.

## 2024-02-15 PROCEDURE — 90853 GROUP PSYCHOTHERAPY: CPT

## 2024-02-15 PROCEDURE — 99232 SBSQ HOSP IP/OBS MODERATE 35: CPT

## 2024-02-15 RX ORDER — HYDROXYZINE HCL 10 MG
50 TABLET ORAL EVERY 6 HOURS
Refills: 0 | Status: DISCONTINUED | OUTPATIENT
Start: 2024-02-15 | End: 2024-02-27

## 2024-02-15 RX ADMIN — ATORVASTATIN CALCIUM 20 MILLIGRAM(S): 80 TABLET, FILM COATED ORAL at 20:23

## 2024-02-15 RX ADMIN — POLYETHYLENE GLYCOL 3350 17 GRAM(S): 17 POWDER, FOR SOLUTION ORAL at 09:27

## 2024-02-15 RX ADMIN — OLANZAPINE 5 MILLIGRAM(S): 15 TABLET, FILM COATED ORAL at 20:23

## 2024-02-15 RX ADMIN — LAMOTRIGINE 25 MILLIGRAM(S): 25 TABLET, ORALLY DISINTEGRATING ORAL at 09:20

## 2024-02-15 RX ADMIN — CLOPIDOGREL BISULFATE 75 MILLIGRAM(S): 75 TABLET, FILM COATED ORAL at 09:20

## 2024-02-15 RX ADMIN — Medication 25 MILLIGRAM(S): at 09:20

## 2024-02-15 RX ADMIN — POLYETHYLENE GLYCOL 3350 17 GRAM(S): 17 POWDER, FOR SOLUTION ORAL at 20:24

## 2024-02-15 NOTE — BH INPATIENT PSYCHIATRY PROGRESS NOTE - NSBHFUPINTERVALHXFT_PSY_A_CORE
Patient reports feeling very good. He wakes up often in the middle of the night and reports getting about 6 hours of accumulative sleep. He reports having a good appetite. Soreness in left knee is stable. No other complaints.

## 2024-02-15 NOTE — BH INPATIENT PSYCHIATRY PROGRESS NOTE - MSE UNSTRUCTURED FT
Appearance: well-groomed, dressed appropriately, appears stated age, good hygiene and grooming  Behavior: calm and cooperative, no psychomotor agitation or slowing, no abnormal movements  Eye contact: good eye contact  Speech: regular rate, normal prosody  Mood: "very good"  Affect: euthymic, mood congruent  Thought process: linear, goal-oriented  Thought content: Future oriented.  Denies SIIP or HIIP.  Perception: denies auditory or visual hallucinations  Cognition: A&O x3, fair attention, language fluent  Insight: fair - improving   Judgement: fair  Impulse control: good

## 2024-02-15 NOTE — BH INPATIENT PSYCHIATRY PROGRESS NOTE - NSBHASSESSSUMMFT_PSY_ALL_CORE
Patient is 48 years old M,  from wife since 11/23, has 15 year daughter who lives with wife, unemployed after being let go from job at , has been living with mother and brother last 3 months, currently evicted. No formal psychiatric history or inpatient admissions. No suicide attempts. + history of substance use- alcohol and cannabis use. Last drank a bottle of 350ml Bacardi yesterday. He was recently discharged from detox in [place] 3 weeks ago. PMH of congenital heart disease, HTN. BIBA referred from crisis center for disorganization and reported aggression at home, admitted to [facility] 2N for concern for bipolar disorder vs substance induced mood disorder.  Likely the latter, given rapid reconstitution on relatively low doses of psychotropic.    Pt at this time does not exhibit any mood or psychosis symptoms.      Plan:   Legal: 9.39  Safety: routine observation. Denies SI/HI/I/P on unit.   	PRn: haldol 5 mg/Ativan 2 mg/Benadryl 50 mg IM/PO prn for agitation  Psychiatry: continue olanzapine and lamotrigine.  Group, milieu, individual therapy as appropriate.  Medical: PMH of congenital heart disease:   	continue with metoprolol succinate ER 25 mg PO/ clopidogrel 75 mg PO/ atorvastatin 20 mg PO  	melatonin 3 mg at bedtime PRN for insomnia  	miralax BID  Dispo: with clinical improvement. Patient continues to require inpatient hospitalization for stabilization and safety. When patient is no longer an acute or imminent risk of harm to self or others, and is able to care for self safely, patient may be discharged.

## 2024-02-16 PROCEDURE — 90832 PSYTX W PT 30 MINUTES: CPT

## 2024-02-16 PROCEDURE — 99232 SBSQ HOSP IP/OBS MODERATE 35: CPT

## 2024-02-16 RX ORDER — NALTREXONE HYDROCHLORIDE 50 MG/1
50 TABLET, FILM COATED ORAL DAILY
Refills: 0 | Status: DISCONTINUED | OUTPATIENT
Start: 2024-02-17 | End: 2024-02-27

## 2024-02-16 RX ORDER — NALTREXONE HYDROCHLORIDE 50 MG/1
50 TABLET, FILM COATED ORAL ONCE
Refills: 0 | Status: COMPLETED | OUTPATIENT
Start: 2024-02-16 | End: 2024-02-16

## 2024-02-16 RX ADMIN — POLYETHYLENE GLYCOL 3350 17 GRAM(S): 17 POWDER, FOR SOLUTION ORAL at 08:53

## 2024-02-16 RX ADMIN — CLOPIDOGREL BISULFATE 75 MILLIGRAM(S): 75 TABLET, FILM COATED ORAL at 08:53

## 2024-02-16 RX ADMIN — NALTREXONE HYDROCHLORIDE 50 MILLIGRAM(S): 50 TABLET, FILM COATED ORAL at 17:02

## 2024-02-16 RX ADMIN — Medication 25 MILLIGRAM(S): at 08:52

## 2024-02-16 RX ADMIN — ATORVASTATIN CALCIUM 20 MILLIGRAM(S): 80 TABLET, FILM COATED ORAL at 20:19

## 2024-02-16 RX ADMIN — POLYETHYLENE GLYCOL 3350 17 GRAM(S): 17 POWDER, FOR SOLUTION ORAL at 20:21

## 2024-02-16 RX ADMIN — LAMOTRIGINE 25 MILLIGRAM(S): 25 TABLET, ORALLY DISINTEGRATING ORAL at 08:52

## 2024-02-16 RX ADMIN — OLANZAPINE 5 MILLIGRAM(S): 15 TABLET, FILM COATED ORAL at 20:20

## 2024-02-16 NOTE — BH INPATIENT PSYCHIATRY PROGRESS NOTE - NSBHFUPINTERVALHXFT_PSY_A_CORE
Patient reports feeling very good and having a good appetite. He reports feeling emotional during group therapy when he sees the word "compassion" but is able tor recognize his emotions and ask for help on how to process his emotions. He continues to wake up in the middle of the night (due to noises from other patients on the unit) but is able to fall back asleep after walking around. Soreness in left knee is stable. No other complaints.   Discussed the use of naltrexone to help with alcohol cravings. Patient agreed to try naltrexone.  Patient reports feeling very good and having a good appetite. He reports feeling emotional during group therapy when he sees the word "compassion" but is able tor recognize his emotions and ask for help on how to process his emotions. He continues to wake up in the middle of the night (due to noises from other patients on the unit) but is able to fall back asleep after walking around. Soreness in left knee is stable. No other complaints.  Discussed the use of naltrexone to help with alcohol cravings. Patient agreed to try naltrexone.

## 2024-02-16 NOTE — BH INPATIENT PSYCHIATRY PROGRESS NOTE - PRN MEDS
MEDICATIONS  (PRN):  hemorrhoidal Ointment 1 Application(s) Rectal four times a day PRN hemorrhoids  hydrOXYzine hydrochloride 50 milliGRAM(s) Oral every 6 hours PRN anxiety/agitation  ibuprofen  Tablet. 400 milliGRAM(s) Oral every 6 hours PRN Moderate Pain (4 - 6)  lidocaine 5% Ointment 1 Application(s) Topical four times a day PRN rectal pain  LORazepam   Injectable 2 milliGRAM(s) IntraMuscular once PRN agitation  melatonin. 3 milliGRAM(s) Oral at bedtime PRN Insomnia

## 2024-02-16 NOTE — BH INPATIENT PSYCHIATRY PROGRESS NOTE - CURRENT MEDICATION
MEDICATIONS  (STANDING):  atorvastatin 20 milliGRAM(s) Oral at bedtime  clopidogrel Tablet 75 milliGRAM(s) Oral daily  folic acid 1 milliGRAM(s) Oral daily  influenza   Vaccine 0.5 milliLiter(s) IntraMuscular once  lamoTRIgine 25 milliGRAM(s) Oral daily  metoprolol succinate ER 25 milliGRAM(s) Oral daily  multivitamin 1 Tablet(s) Oral daily  OLANZapine 5 milliGRAM(s) Oral at bedtime  polyethylene glycol 3350 17 Gram(s) Oral two times a day  thiamine 100 milliGRAM(s) Oral daily    MEDICATIONS  (PRN):  hemorrhoidal Ointment 1 Application(s) Rectal four times a day PRN hemorrhoids  hydrOXYzine hydrochloride 50 milliGRAM(s) Oral every 6 hours PRN anxiety/agitation  ibuprofen  Tablet. 400 milliGRAM(s) Oral every 6 hours PRN Moderate Pain (4 - 6)  lidocaine 5% Ointment 1 Application(s) Topical four times a day PRN rectal pain  LORazepam   Injectable 2 milliGRAM(s) IntraMuscular once PRN agitation  melatonin. 3 milliGRAM(s) Oral at bedtime PRN Insomnia   MEDICATIONS  (STANDING):  atorvastatin 20 milliGRAM(s) Oral at bedtime  clopidogrel Tablet 75 milliGRAM(s) Oral daily  folic acid 1 milliGRAM(s) Oral daily  influenza   Vaccine 0.5 milliLiter(s) IntraMuscular once  lamoTRIgine 25 milliGRAM(s) Oral daily  metoprolol succinate ER 25 milliGRAM(s) Oral daily  multivitamin 1 Tablet(s) Oral daily  naltrexone 50 milliGRAM(s) Oral once  OLANZapine 5 milliGRAM(s) Oral at bedtime  polyethylene glycol 3350 17 Gram(s) Oral two times a day  thiamine 100 milliGRAM(s) Oral daily    MEDICATIONS  (PRN):  hemorrhoidal Ointment 1 Application(s) Rectal four times a day PRN hemorrhoids  hydrOXYzine hydrochloride 50 milliGRAM(s) Oral every 6 hours PRN anxiety/agitation  ibuprofen  Tablet. 400 milliGRAM(s) Oral every 6 hours PRN Moderate Pain (4 - 6)  lidocaine 5% Ointment 1 Application(s) Topical four times a day PRN rectal pain  LORazepam   Injectable 2 milliGRAM(s) IntraMuscular once PRN agitation  melatonin. 3 milliGRAM(s) Oral at bedtime PRN Insomnia

## 2024-02-16 NOTE — BH INPATIENT PSYCHIATRY PROGRESS NOTE - NSBHASSESSSUMMFT_PSY_ALL_CORE
Patient is 48 years old M,  from wife since 11/23, has 15 year daughter who lives with wife, unemployed after being let go from job at , has been living with mother and brother last 3 months, currently evicted. No formal psychiatric history or inpatient admissions. No suicide attempts. + history of substance use- alcohol and cannabis use. Last drank a bottle of 350ml Bacardi yesterday. He was recently discharged from detox in [place] 3 weeks ago. PMH of congenital heart disease, HTN. BIBA referred from crisis center for disorganization and reported aggression at home, admitted to [facility] 2N for concern for bipolar disorder vs substance induced mood disorder.  Likely the latter, given rapid reconstitution on relatively low doses of psychotropic.    Pt at this time does not exhibit any mood or psychosis symptoms. Will begin naltrexone for alcohol use disorder.     Plan:   Legal: 9.39  Safety: routine observation. Denies SI/HI/I/P on unit.   	PRn: haldol 5 mg/Ativan 2 mg/Benadryl 50 mg IM/PO prn for agitation  Psychiatry: continue olanzapine and lamotrigine.  Group, milieu, individual therapy as appropriate.  Medical: PMH of congenital heart disease:   	continue with metoprolol succinate ER 25 mg PO/ clopidogrel 75 mg PO/ atorvastatin 20 mg PO  	melatonin 3 mg at bedtime PRN for insomnia  	miralax BID  Dispo: with clinical improvement. Patient continues to require inpatient hospitalization for stabilization and safety. When patient is no longer an acute or imminent risk of harm to self or others, and is able to care for self safely, patient may be discharged.    Patient is 48 years old M,  from wife since 11/23, has 15 year daughter who lives with wife, unemployed after being let go from job at , has been living with mother and brother last 3 months, currently evicted. No formal psychiatric history or inpatient admissions. No suicide attempts. + history of substance use- alcohol and cannabis use. Last drank a bottle of 350ml Bacardi yesterday. He was recently discharged from detox in [place] 3 weeks ago. PMH of congenital heart disease, HTN. BIBA referred from crisis center for disorganization and reported aggression at home, admitted to [facility] 2N for concern for bipolar disorder vs substance induced mood disorder.  Likely the latter, given rapid reconstitution on relatively low doses of psychotropic.    Pt at this time does not exhibit any mood or psychosis symptoms. Will begin naltrexone for alcohol use disorder.     Plan:   Legal: 9.39  Safety: routine observation. Denies SI/HI/I/P on unit.   	PRn: haldol 5 mg/Ativan 2 mg/Benadryl 50 mg IM/PO prn for agitation  Psychiatry: continue olanzapine and lamotrigine.  Naltrexone 50 mg PO daily.  Group, milieu, individual therapy as appropriate.  Medical: PMH of congenital heart disease:   	continue with metoprolol succinate ER 25 mg PO/ clopidogrel 75 mg PO/ atorvastatin 20 mg PO  	melatonin 3 mg at bedtime PRN for insomnia  	miralax BID  Dispo: with clinical improvement. Patient continues to require inpatient hospitalization for stabilization and safety. When patient is no longer an acute or imminent risk of harm to self or others, and is able to care for self safely, patient may be discharged.

## 2024-02-16 NOTE — BH INPATIENT PSYCHIATRY PROGRESS NOTE - MSE UNSTRUCTURED FT
Appearance: well-groomed, dressed appropriately, appears stated age, good hygiene and grooming  Behavior: calm and cooperative, no psychomotor agitation or slowing, no abnormal movements  Eye contact: good eye contact  Speech: regular rate, normal prosody  Mood: "optimistic"  Affect: euthymic, mood congruent  Thought process: linear, goal-oriented  Thought content: Future oriented.  Denies SIIP or HIIP.  Perception: denies auditory or visual hallucinations  Cognition: A&O x3, fair attention, language fluent  Insight: fair - improving   Judgement: fair  Impulse control: good

## 2024-02-17 RX ADMIN — Medication 3 MILLIGRAM(S): at 21:49

## 2024-02-17 RX ADMIN — LAMOTRIGINE 25 MILLIGRAM(S): 25 TABLET, ORALLY DISINTEGRATING ORAL at 09:17

## 2024-02-17 RX ADMIN — POLYETHYLENE GLYCOL 3350 17 GRAM(S): 17 POWDER, FOR SOLUTION ORAL at 09:18

## 2024-02-17 RX ADMIN — NALTREXONE HYDROCHLORIDE 50 MILLIGRAM(S): 50 TABLET, FILM COATED ORAL at 09:17

## 2024-02-17 RX ADMIN — Medication 400 MILLIGRAM(S): at 12:16

## 2024-02-17 RX ADMIN — ATORVASTATIN CALCIUM 20 MILLIGRAM(S): 80 TABLET, FILM COATED ORAL at 21:48

## 2024-02-17 RX ADMIN — CLOPIDOGREL BISULFATE 75 MILLIGRAM(S): 75 TABLET, FILM COATED ORAL at 09:17

## 2024-02-17 RX ADMIN — Medication 25 MILLIGRAM(S): at 09:17

## 2024-02-17 RX ADMIN — OLANZAPINE 5 MILLIGRAM(S): 15 TABLET, FILM COATED ORAL at 21:48

## 2024-02-18 RX ADMIN — Medication 3 MILLIGRAM(S): at 20:03

## 2024-02-18 RX ADMIN — ATORVASTATIN CALCIUM 20 MILLIGRAM(S): 80 TABLET, FILM COATED ORAL at 20:03

## 2024-02-18 RX ADMIN — OLANZAPINE 5 MILLIGRAM(S): 15 TABLET, FILM COATED ORAL at 20:03

## 2024-02-18 RX ADMIN — NALTREXONE HYDROCHLORIDE 50 MILLIGRAM(S): 50 TABLET, FILM COATED ORAL at 09:15

## 2024-02-18 RX ADMIN — CLOPIDOGREL BISULFATE 75 MILLIGRAM(S): 75 TABLET, FILM COATED ORAL at 09:15

## 2024-02-18 RX ADMIN — LAMOTRIGINE 25 MILLIGRAM(S): 25 TABLET, ORALLY DISINTEGRATING ORAL at 09:15

## 2024-02-18 RX ADMIN — Medication 25 MILLIGRAM(S): at 09:15

## 2024-02-19 RX ADMIN — ATORVASTATIN CALCIUM 20 MILLIGRAM(S): 80 TABLET, FILM COATED ORAL at 20:30

## 2024-02-19 RX ADMIN — Medication 400 MILLIGRAM(S): at 10:13

## 2024-02-19 RX ADMIN — LAMOTRIGINE 25 MILLIGRAM(S): 25 TABLET, ORALLY DISINTEGRATING ORAL at 08:45

## 2024-02-19 RX ADMIN — CLOPIDOGREL BISULFATE 75 MILLIGRAM(S): 75 TABLET, FILM COATED ORAL at 08:46

## 2024-02-19 RX ADMIN — Medication 25 MILLIGRAM(S): at 08:46

## 2024-02-19 RX ADMIN — Medication 400 MILLIGRAM(S): at 10:47

## 2024-02-19 RX ADMIN — POLYETHYLENE GLYCOL 3350 17 GRAM(S): 17 POWDER, FOR SOLUTION ORAL at 20:30

## 2024-02-19 RX ADMIN — POLYETHYLENE GLYCOL 3350 17 GRAM(S): 17 POWDER, FOR SOLUTION ORAL at 10:03

## 2024-02-19 RX ADMIN — NALTREXONE HYDROCHLORIDE 50 MILLIGRAM(S): 50 TABLET, FILM COATED ORAL at 08:46

## 2024-02-19 RX ADMIN — OLANZAPINE 5 MILLIGRAM(S): 15 TABLET, FILM COATED ORAL at 20:30

## 2024-02-20 PROCEDURE — 99232 SBSQ HOSP IP/OBS MODERATE 35: CPT

## 2024-02-20 RX ADMIN — NALTREXONE HYDROCHLORIDE 50 MILLIGRAM(S): 50 TABLET, FILM COATED ORAL at 09:27

## 2024-02-20 RX ADMIN — CLOPIDOGREL BISULFATE 75 MILLIGRAM(S): 75 TABLET, FILM COATED ORAL at 09:28

## 2024-02-20 RX ADMIN — ATORVASTATIN CALCIUM 20 MILLIGRAM(S): 80 TABLET, FILM COATED ORAL at 21:00

## 2024-02-20 RX ADMIN — POLYETHYLENE GLYCOL 3350 17 GRAM(S): 17 POWDER, FOR SOLUTION ORAL at 20:59

## 2024-02-20 RX ADMIN — Medication 25 MILLIGRAM(S): at 09:28

## 2024-02-20 RX ADMIN — OLANZAPINE 5 MILLIGRAM(S): 15 TABLET, FILM COATED ORAL at 21:00

## 2024-02-20 RX ADMIN — LAMOTRIGINE 25 MILLIGRAM(S): 25 TABLET, ORALLY DISINTEGRATING ORAL at 09:27

## 2024-02-20 NOTE — BH INPATIENT PSYCHIATRY PROGRESS NOTE - MSE UNSTRUCTURED FT
Appearance: Dressed appropriately.  Behavior: Cooperative.    Motor: No abnormal movements, no psychomotor slowing or activation.  Speech: Regular rate.  Mood: "Fine."  Affect: Neutral.  Thought Process: Linear.  Associations: Fair.  Thought Content: Denies AVH.  Denies SIIP or HIIP.  Insight: Limited.  Judgment: Fair on interview.  Attention: Fair.  Language: Fluent.  Gait: Intact.

## 2024-02-20 NOTE — BH INPATIENT PSYCHIATRY PROGRESS NOTE - NSBHASSESSSUMMFT_PSY_ALL_CORE
Patient is 48 years old M,  from wife since 11/23, has 15 year daughter who lives with wife, unemployed after being let go from job at , has been living with mother and brother last 3 months, currently evicted. No formal psychiatric history or inpatient admissions. No suicide attempts. + history of substance use- alcohol and cannabis use. Last drank a bottle of 350ml Bacardi yesterday. He was recently discharged from detox in [place] 3 weeks ago. PMH of congenital heart disease, HTN. BIBA referred from crisis center for disorganization and reported aggression at home, admitted to [facility] 2N for concern for bipolar disorder vs substance induced mood disorder.  Likely the latter, given rapid reconstitution on relatively low doses of psychotropic.    Continues to be psychiatrically stable.     Plan:   Legal: 9.39  Safety: routine observation. Denies SI/HI/I/P on unit.   	PRn: haldol 5 mg/Ativan 2 mg/Benadryl 50 mg IM/PO prn for agitation  Psychiatry: continue olanzapine and lamotrigine.  Naltrexone 50 mg PO daily.  Group, milieu, individual therapy as appropriate.  Medical: PMH of congenital heart disease:   	continue with metoprolol succinate ER 25 mg PO/ clopidogrel 75 mg PO/ atorvastatin 20 mg PO  	melatonin 3 mg at bedtime PRN for insomnia  	miralax BID  Dispo: inpatient rehab, awaiting activation of Medicaid.

## 2024-02-20 NOTE — BH INPATIENT PSYCHIATRY PROGRESS NOTE - CURRENT MEDICATION
MEDICATIONS  (STANDING):  atorvastatin 20 milliGRAM(s) Oral at bedtime  clopidogrel Tablet 75 milliGRAM(s) Oral daily  folic acid 1 milliGRAM(s) Oral daily  influenza   Vaccine 0.5 milliLiter(s) IntraMuscular once  lamoTRIgine 25 milliGRAM(s) Oral daily  metoprolol succinate ER 25 milliGRAM(s) Oral daily  multivitamin 1 Tablet(s) Oral daily  naltrexone 50 milliGRAM(s) Oral daily  OLANZapine 5 milliGRAM(s) Oral at bedtime  polyethylene glycol 3350 17 Gram(s) Oral two times a day  thiamine 100 milliGRAM(s) Oral daily    MEDICATIONS  (PRN):  hemorrhoidal Ointment 1 Application(s) Rectal four times a day PRN hemorrhoids  hydrOXYzine hydrochloride 50 milliGRAM(s) Oral every 6 hours PRN anxiety/agitation  ibuprofen  Tablet. 400 milliGRAM(s) Oral every 6 hours PRN Moderate Pain (4 - 6)  lidocaine 5% Ointment 1 Application(s) Topical four times a day PRN rectal pain  LORazepam   Injectable 2 milliGRAM(s) IntraMuscular once PRN agitation  melatonin. 3 milliGRAM(s) Oral at bedtime PRN Insomnia

## 2024-02-20 NOTE — BH INPATIENT PSYCHIATRY PROGRESS NOTE - NSBHFUPINTERVALHXFT_PSY_A_CORE
Retention Suture Bite Size: 3 mm Pt is very eager to start inpatient rehab and then work towards getting a new job.

## 2024-02-21 PROCEDURE — 99232 SBSQ HOSP IP/OBS MODERATE 35: CPT

## 2024-02-21 RX ADMIN — LAMOTRIGINE 25 MILLIGRAM(S): 25 TABLET, ORALLY DISINTEGRATING ORAL at 08:37

## 2024-02-21 RX ADMIN — OLANZAPINE 5 MILLIGRAM(S): 15 TABLET, FILM COATED ORAL at 20:26

## 2024-02-21 RX ADMIN — POLYETHYLENE GLYCOL 3350 17 GRAM(S): 17 POWDER, FOR SOLUTION ORAL at 20:26

## 2024-02-21 RX ADMIN — Medication 25 MILLIGRAM(S): at 08:37

## 2024-02-21 RX ADMIN — NALTREXONE HYDROCHLORIDE 50 MILLIGRAM(S): 50 TABLET, FILM COATED ORAL at 08:37

## 2024-02-21 RX ADMIN — LIDOCAINE 1 APPLICATION(S): 4 CREAM TOPICAL at 10:42

## 2024-02-21 RX ADMIN — CLOPIDOGREL BISULFATE 75 MILLIGRAM(S): 75 TABLET, FILM COATED ORAL at 08:37

## 2024-02-21 RX ADMIN — ATORVASTATIN CALCIUM 20 MILLIGRAM(S): 80 TABLET, FILM COATED ORAL at 20:26

## 2024-02-21 NOTE — BH INPATIENT PSYCHIATRY PROGRESS NOTE - MSE UNSTRUCTURED FT
Appearance: Dressed appropriately.  Behavior: Cooperative.    Motor: No abnormal movements, no psychomotor slowing or activation.  Speech: Regular rate.  Mood: "Fine."  Affect: Neutral.  Thought Process: Linear.  Associations: Fair.  Thought Content: Denies AVH.  Denies SIIP or HIIP.  Insight: Limited.  Judgment: Fair on interview.  Attention: Fair.  Language: Fluent.  Gait: Intact.    Appearance: Dressed appropriately.  Behavior: Cooperative.    Motor: No abnormal movements, no psychomotor slowing or activation.  Speech: Regular rate.  Mood: "good"  Affect: Neutral.  Thought Process: Linear, goal-oriented  Associations: Fair.  Thought Content: Denies AVH.  Denies SIIP or HIIP.  Insight: Limited.  Judgment: Fair  Attention: Fair.  Language: Fluent.  Gait: Intact.

## 2024-02-21 NOTE — BH INPATIENT PSYCHIATRY PROGRESS NOTE - NSBHFUPINTERVALHXFT_PSY_A_CORE
Patient is bored and anxious about staying in the hospital for a long time. He is waiting for his medicaid application to return.     He reports sleeping at least 6 hours at night and having a good appetite. Taking medications as prescribes and denies medication side effects.    Patient is bored and anxious about staying in the hospital for a long time. He is waiting for his medicaid application to return. He requests to go home to his mother if medicaid/housing application takes too long. Previously, his mother only agreed to let him back if he completes inpatient rehab. Team to call mother to see if she changed her mind.   Patient reports sleeping at least 6 hours at night and having a good appetite. Taking medications as prescribes and denies medication side effects.    Patient is bored and anxious about staying in the hospital for a long time. He is waiting for his medicaid application to return. He requests to go home to his mother if medicaid/housing application takes too long. Patient reports sleeping at least 6 hours at night and having a good appetite. Taking medications as prescribes and denies medication side effects.

## 2024-02-21 NOTE — BH INPATIENT PSYCHIATRY PROGRESS NOTE - NSBHASSESSSUMMFT_PSY_ALL_CORE
Patient is 48 years old M,  from wife since 11/23, has 15 year daughter who lives with wife, unemployed after being let go from job at , has been living with mother and brother last 3 months, currently evicted. No formal psychiatric history or inpatient admissions. No suicide attempts. + history of substance use- alcohol and cannabis use. Last drank a bottle of 350ml Bacardi yesterday. He was recently discharged from detox in [place] 3 weeks ago. PMH of congenital heart disease, HTN. BIBA referred from crisis center for disorganization and reported aggression at home, admitted to [facility] 2N for concern for bipolar disorder vs substance induced mood disorder.  Likely the latter, given rapid reconstitution on relatively low doses of psychotropic.    Continues to be psychiatrically stable.     Plan:   Legal: 9.39  Safety: routine observation. Denies SI/HI/I/P on unit.   	PRn: haldol 5 mg/Ativan 2 mg/Benadryl 50 mg IM/PO prn for agitation  Psychiatry: continue olanzapine and lamotrigine.  Naltrexone 50 mg PO daily.  Group, milieu, individual therapy as appropriate.  Medical: PMH of congenital heart disease:   	continue with metoprolol succinate ER 25 mg PO/ clopidogrel 75 mg PO/ atorvastatin 20 mg PO  	melatonin 3 mg at bedtime PRN for insomnia  	miralax BID  Dispo: inpatient rehab, awaiting activation of Medicaid.   Patient is 48 years old M,  from wife since 11/23, has 15 year daughter who lives with wife, unemployed after being let go from job at , has been living with mother and brother last 3 months, currently evicted. No formal psychiatric history or inpatient admissions. No suicide attempts. + history of substance use- alcohol and cannabis use. Last drank a bottle of 350ml Bacardi yesterday. He was recently discharged from detox in [place] 3 weeks ago. PMH of congenital heart disease, HTN. BIBA referred from crisis center for disorganization and reported aggression at home, admitted to [facility] 2N for concern for bipolar disorder vs substance induced mood disorder.  Likely the latter, given rapid reconstitution on relatively low doses of psychotropic.    Continues to be psychiatrically stable.     Plan:   Legal: 9.39  Safety: routine observation. Denies SI/HI/I/P on unit.   	PRn: haldol 5 mg/Ativan 2 mg/Benadryl 50 mg IM/PO prn for agitation  Psychiatry: continue olanzapine and lamotrigine.  Naltrexone 50 mg PO daily.  Pt declined Vivitrol.  Group, milieu, individual therapy as appropriate.  Medical: PMH of congenital heart disease:   	continue with metoprolol succinate ER 25 mg PO/ clopidogrel 75 mg PO/ atorvastatin 20 mg PO  	melatonin 3 mg at bedtime PRN for insomnia  	miralax BID  Dispo: Dual dx clinic.  Pt declines inpt rehab today.

## 2024-02-22 PROCEDURE — 90853 GROUP PSYCHOTHERAPY: CPT

## 2024-02-22 PROCEDURE — 90832 PSYTX W PT 30 MINUTES: CPT | Mod: 59

## 2024-02-22 PROCEDURE — 99232 SBSQ HOSP IP/OBS MODERATE 35: CPT

## 2024-02-22 RX ADMIN — OLANZAPINE 5 MILLIGRAM(S): 15 TABLET, FILM COATED ORAL at 20:10

## 2024-02-22 RX ADMIN — CLOPIDOGREL BISULFATE 75 MILLIGRAM(S): 75 TABLET, FILM COATED ORAL at 08:41

## 2024-02-22 RX ADMIN — ATORVASTATIN CALCIUM 20 MILLIGRAM(S): 80 TABLET, FILM COATED ORAL at 20:10

## 2024-02-22 RX ADMIN — NALTREXONE HYDROCHLORIDE 50 MILLIGRAM(S): 50 TABLET, FILM COATED ORAL at 08:40

## 2024-02-22 RX ADMIN — LAMOTRIGINE 25 MILLIGRAM(S): 25 TABLET, ORALLY DISINTEGRATING ORAL at 08:41

## 2024-02-22 RX ADMIN — Medication 25 MILLIGRAM(S): at 08:40

## 2024-02-22 RX ADMIN — POLYETHYLENE GLYCOL 3350 17 GRAM(S): 17 POWDER, FOR SOLUTION ORAL at 20:09

## 2024-02-22 RX ADMIN — Medication 3 MILLIGRAM(S): at 01:47

## 2024-02-22 NOTE — BH TREATMENT PLAN - NSCMSPTSTRENGTHS_PSY_ALL_CORE
Expressive of emotions/Future/goal oriented/Highly motivated for treatment/Intelligence/Motivated

## 2024-02-22 NOTE — BH TREATMENT PLAN - NSPTSTATEDGOAL_PSY_ALL_CORE
"To get back on track, heal, become stable, let go of anger. I want to gain more patience and have a fresh start. Eventually, I hope to get an apartment to live on my own, maybe get an online masters. I also want to do kickboxing, music composing, and get a job that is less stressful."

## 2024-02-22 NOTE — BH TREATMENT PLAN - NSTXSUBMISINTERPR_PSY_ALL_CORE
Psych rehab recommends that patient attend individual and group therapy for support, psychoeducation and skill-integration as well as to facilitate progress towards specified goal.

## 2024-02-22 NOTE — BH TREATMENT PLAN - NSTXIMPULSINTERRN_PSY_ALL_CORE
RN will assist patient in identifying coping skills to help woth impulse control
RN will assist patient in identifying coping skills to help woth impulse control

## 2024-02-22 NOTE — BH TREATMENT PLAN - NSTXSLPPATINTERMD_PSY_ALL_CORE
med management, started on Lamictal and Olanzapine, titrate to efficacy/ group, milieu therapy

## 2024-02-22 NOTE — BH INPATIENT PSYCHIATRY PROGRESS NOTE - NSBHASSESSSUMMFT_PSY_ALL_CORE
Patient is 48 years old M,  from wife since 11/23, has 15 year daughter who lives with wife, unemployed after being let go from job at , has been living with mother and brother last 3 months, currently evicted. No formal psychiatric history or inpatient admissions. No suicide attempts. + history of substance use- alcohol and cannabis use. Last drank a bottle of 350ml Bacardi yesterday. He was recently discharged from detox in [place] 3 weeks ago. PMH of congenital heart disease, HTN. BIBA referred from crisis center for disorganization and reported aggression at home, admitted to [facility] for concern for bipolar disorder vs substance induced mood disorder.  Likely the latter, given rapid reconstitution on relatively low doses of psychotropic.    Continues to be psychiatrically stable.     Plan:   Legal: 9.39  Safety: routine observation. Denies SI/HI/I/P on unit.   	PRn: haldol 5 mg/Ativan 2 mg/Benadryl 50 mg IM/PO prn for agitation  Psychiatry: continue olanzapine and lamotrigine.  Naltrexone 50 mg PO daily.  Pt declined Vivitrol.  Group, milieu, individual therapy as appropriate.  Medical: PMH of congenital heart disease:   	continue with metoprolol succinate ER 25 mg PO/ clopidogrel 75 mg PO/ atorvastatin 20 mg PO  	melatonin 3 mg at bedtime PRN for insomnia  	miralax BID  Dispo: Dual dx clinic.  Pt declines inpt rehab today.   Patient is 48 years old M,  from wife since 11/23, has 15 year daughter who lives with wife, unemployed after being let go from job at , has been living with mother and brother last 3 months, currently evicted. No formal psychiatric history or inpatient admissions. No suicide attempts. + history of substance use- alcohol and cannabis use. Last drank a bottle of 350ml Bacardi yesterday. He was recently discharged from detox in [place] 3 weeks ago. PMH of congenital heart disease, HTN. BIBA referred from crisis center for disorganization and reported aggression at home, admitted to [facility] for concern for bipolar disorder vs substance induced mood disorder.  Likely the latter, given rapid reconstitution on relatively low doses of psychotropic.    Continues to be psychiatrically stable.     Plan:   Legal: 9.39  Safety: routine observation. Denies SI/HI/I/P on unit.   	PRn: haldol 5 mg/Ativan 2 mg/Benadryl 50 mg IM/PO prn for agitation  Psychiatry: continue olanzapine and lamotrigine.  Naltrexone 50 mg PO daily.  Pt declined Vivitrol.  Group, milieu, individual therapy as appropriate.  Medical: PMH of congenital heart disease:   	continue with metoprolol succinate ER 25 mg PO/ clopidogrel 75 mg PO/ atorvastatin 20 mg PO  	melatonin 3 mg at bedtime PRN for insomnia  	miralax BID  Dispo: Dual dx clinic.  Pt declined inpt rehab.

## 2024-02-22 NOTE — BH TREATMENT PLAN - NSTXDCOPLKINTERSW_PSY_ALL_CORE
Support and psychoed being provided and patient to be referred for inpatient substance rehab awaiting activation of Medicaid.
Patient will be educated on appropriate outpatient care.
Patient will be educated on appropriate outpatient care.

## 2024-02-22 NOTE — BH TREATMENT PLAN - NSTXSUBMISINTERRN_PSY_ALL_CORE
RN will assist patient in identifying healthy coping choices
RN will assist patient in identifying healthy coping choices
Pt. encouraged to aknowledge that substance misuse is a problem. Pt. encouraged to identify at least two goals in life that will help motivate them towards recovery. Pt. encourage to identify triggers that cause them to seek substance misuse. Pt. encouraged to identify effective coping skills and brainstorm additional coping skills. Pt. encouraged to seek staff support.

## 2024-02-22 NOTE — BH PSYCHOLOGY - CLINICIAN PSYCHOTHERAPY NOTE - NSTXSUBMISGOAL_PSY_ALL_CORE
Will verbalize 2 areas that serve as motivation for change as it pertains to addiction

## 2024-02-22 NOTE — BH INPATIENT PSYCHIATRY PROGRESS NOTE - NSBHFUPINTERVALHXFT_PSY_A_CORE
Patient reports that naltrexone is helping curb his alcohol cravings. He is taking medications as prescribes and denies medication side effects. Knee pain is stable.    Patient reports that naltrexone is helping curb his alcohol cravings. He is taking medications as prescribes and denies medication side effects. Knee pain is stable with Motrin.

## 2024-02-22 NOTE — BH TREATMENT PLAN - NSDCCRITERIA_PSY_ALL_CORE
When patient is no longer an acute or imminent risk of harm to self or others, and is able to care for self safely

## 2024-02-22 NOTE — BH TREATMENT PLAN - NSTXALCDRGINTERRN_PSY_ALL_CORE
RN will assess patient per [facility] protocol
Assess pt for signs/symptoms of withdrawal, educate pt on the signs/symptoms of withdrawal and to notify staff, maintain pt on CIWA protocol, educate on the importance of abstaining from use, explore treatment options, 12-step programs and healthy coping skills, identify triggers, provide support, encourage participation in groups and unit activities
RN will assess patient per [facility] protocol

## 2024-02-22 NOTE — BH TREATMENT PLAN - NSTXSUBMISGOAL_PSY_ALL_CORE
Will verbalize 2 areas that serve as motivation for change as it pertains to addiction

## 2024-02-22 NOTE — BH INPATIENT PSYCHIATRY PROGRESS NOTE - MSE UNSTRUCTURED FT
Appearance: Dressed appropriately with good hygiene   Behavior: Cooperative, good eye contact  Motor: No abnormal movements, no psychomotor slowing or activation.  Speech: Regular rate and volume  Mood: "calm"  Affect: Neutral.  Thought Process: Linear, goal-oriented  Thought Content: Denies AVH.  Denies SIIP or HIIP.  Insight: Limited.  Judgment: Fair  Attention: Fair.  Language: Fluent.  Gait: Intact.

## 2024-02-23 PROCEDURE — 99232 SBSQ HOSP IP/OBS MODERATE 35: CPT

## 2024-02-23 PROCEDURE — 90853 GROUP PSYCHOTHERAPY: CPT

## 2024-02-23 RX ORDER — ATORVASTATIN CALCIUM 80 MG/1
1 TABLET, FILM COATED ORAL
Qty: 14 | Refills: 0
Start: 2024-02-23 | End: 2024-03-07

## 2024-02-23 RX ORDER — NALTREXONE HYDROCHLORIDE 50 MG/1
1 TABLET, FILM COATED ORAL
Qty: 14 | Refills: 0
Start: 2024-02-23 | End: 2024-03-07

## 2024-02-23 RX ORDER — OLANZAPINE 15 MG/1
1 TABLET, FILM COATED ORAL
Qty: 14 | Refills: 0
Start: 2024-02-23 | End: 2024-03-07

## 2024-02-23 RX ORDER — CLOPIDOGREL BISULFATE 75 MG/1
1 TABLET, FILM COATED ORAL
Refills: 0 | DISCHARGE

## 2024-02-23 RX ORDER — ATORVASTATIN CALCIUM 80 MG/1
1 TABLET, FILM COATED ORAL
Refills: 0 | DISCHARGE

## 2024-02-23 RX ORDER — LAMOTRIGINE 25 MG/1
1 TABLET, ORALLY DISINTEGRATING ORAL
Qty: 14 | Refills: 0
Start: 2024-02-23 | End: 2024-03-07

## 2024-02-23 RX ORDER — CLOPIDOGREL BISULFATE 75 MG/1
1 TABLET, FILM COATED ORAL
Qty: 14 | Refills: 0
Start: 2024-02-23 | End: 2024-03-07

## 2024-02-23 RX ORDER — METOPROLOL TARTRATE 50 MG
1 TABLET ORAL
Qty: 14 | Refills: 0
Start: 2024-02-23 | End: 2024-03-07

## 2024-02-23 RX ORDER — METOPROLOL TARTRATE 50 MG
1 TABLET ORAL
Refills: 0 | DISCHARGE

## 2024-02-23 RX ADMIN — OLANZAPINE 5 MILLIGRAM(S): 15 TABLET, FILM COATED ORAL at 21:25

## 2024-02-23 RX ADMIN — LAMOTRIGINE 25 MILLIGRAM(S): 25 TABLET, ORALLY DISINTEGRATING ORAL at 08:24

## 2024-02-23 RX ADMIN — NALTREXONE HYDROCHLORIDE 50 MILLIGRAM(S): 50 TABLET, FILM COATED ORAL at 08:25

## 2024-02-23 RX ADMIN — CLOPIDOGREL BISULFATE 75 MILLIGRAM(S): 75 TABLET, FILM COATED ORAL at 08:24

## 2024-02-23 RX ADMIN — Medication 25 MILLIGRAM(S): at 08:24

## 2024-02-23 RX ADMIN — ATORVASTATIN CALCIUM 20 MILLIGRAM(S): 80 TABLET, FILM COATED ORAL at 21:27

## 2024-02-23 RX ADMIN — Medication 3 MILLIGRAM(S): at 21:28

## 2024-02-23 NOTE — BH INPATIENT PSYCHIATRY PROGRESS NOTE - NSBHMETABOLIC_PSY_ALL_CORE_FT
BMI: BMI (kg/m2): 25.8 (02-08-24 @ 21:31)  HbA1c: A1C with Estimated Average Glucose Result: 5.5 % (02-10-24 @ 10:45)    Glucose:   BP: --Vital Signs Last 24 Hrs  T(C): 36.6 (02-12-24 @ 07:54), Max: 36.6 (02-12-24 @ 07:54)  T(F): 97.9 (02-12-24 @ 07:54), Max: 97.9 (02-12-24 @ 07:54)  HR: --  BP: --  BP(mean): --  RR: --  SpO2: --    Orthostatic VS  02-12-24 @ 07:54  Lying BP: --/-- HR: --  Sitting BP: 124/82 HR: 60  Standing BP: 126/88 HR: 62  Site: --  Mode: --  Orthostatic VS  02-11-24 @ 20:44  Lying BP: --/-- HR: --  Sitting BP: 139/88 HR: 73  Standing BP: 140/91 HR: 72  Site: --  Mode: --  Orthostatic VS  02-11-24 @ 07:55  Lying BP: --/-- HR: --  Sitting BP: 124/90 HR: 93  Standing BP: 145/97 HR: 95  Site: --  Mode: --  Orthostatic VS  02-10-24 @ 20:42  Lying BP: --/-- HR: --  Sitting BP: 142/104 HR: 71  Standing BP: 138/89 HR: 65  Site: --  Mode: --    Lipid Panel: Date/Time: 02-10-24 @ 10:45  Cholesterol, Serum: 188  LDL Cholesterol Calculated: 88  HDL Cholesterol, Serum: 55  Total Cholesterol/HDL Ration Measurement: --  Triglycerides, Serum: 225  
BMI: BMI (kg/m2): 25.8 (02-08-24 @ 21:31)  HbA1c: A1C with Estimated Average Glucose Result: 5.5 % (02-10-24 @ 10:45)    Glucose:   BP: 126/83 (02-08-24 @ 20:28) (125/91 - 126/83)Vital Signs Last 24 Hrs  T(C): 36.2 (02-11-24 @ 07:55), Max: 36.5 (02-10-24 @ 20:42)  T(F): 97.1 (02-11-24 @ 07:55), Max: 97.7 (02-10-24 @ 20:42)  HR: --  BP: --  BP(mean): --  RR: --  SpO2: 98% (02-10-24 @ 20:42) (98% - 98%)    Orthostatic VS  02-11-24 @ 07:55  Lying BP: --/-- HR: --  Sitting BP: 124/90 HR: 93  Standing BP: 145/97 HR: 95  Site: --  Mode: --  Orthostatic VS  02-10-24 @ 20:42  Lying BP: --/-- HR: --  Sitting BP: 142/104 HR: 71  Standing BP: 138/89 HR: 65  Site: --  Mode: --  Orthostatic VS  02-10-24 @ 07:43  Lying BP: --/-- HR: --  Sitting BP: 136/89 HR: 58  Standing BP: 145/90 HR: 58  Site: --  Mode: --  Orthostatic VS  02-09-24 @ 21:05  Lying BP: --/-- HR: --  Sitting BP: 127/85 HR: 66  Standing BP: 129/78 HR: 62  Site: --  Mode: --    Lipid Panel: Date/Time: 02-10-24 @ 10:45  Cholesterol, Serum: 188  LDL Cholesterol Calculated: 88  HDL Cholesterol, Serum: 55  Total Cholesterol/HDL Ration Measurement: --  Triglycerides, Serum: 225  
BMI: BMI (kg/m2): 25.8 (02-08-24 @ 21:31)  HbA1c: A1C with Estimated Average Glucose Result: 5.5 % (02-10-24 @ 10:45)    Glucose:   BP: --Vital Signs Last 24 Hrs  T(C): 36.3 (02-20-24 @ 07:48), Max: 36.3 (02-20-24 @ 07:48)  T(F): 97.3 (02-20-24 @ 07:48), Max: 97.3 (02-20-24 @ 07:48)  HR: --  BP: --  BP(mean): --  RR: --  SpO2: --    Orthostatic VS  02-20-24 @ 07:48  Lying BP: --/-- HR: --  Sitting BP: 134/80 HR: 63  Standing BP: 149/90 HR: 64  Site: --  Mode: --  Orthostatic VS  02-19-24 @ 08:15  Lying BP: --/-- HR: --  Sitting BP: 129/88 HR: 71  Standing BP: 136/90 HR: 77  Site: --  Mode: --    Lipid Panel: Date/Time: 02-10-24 @ 10:45  Cholesterol, Serum: 188  LDL Cholesterol Calculated: 88  HDL Cholesterol, Serum: 55  Total Cholesterol/HDL Ration Measurement: --  Triglycerides, Serum: 225  
BMI: BMI (kg/m2): 25.8 (02-08-24 @ 21:31)  HbA1c: A1C with Estimated Average Glucose Result: 5.5 % (02-10-24 @ 10:45)    Glucose:   BP: --Vital Signs Last 24 Hrs  T(C): --  T(F): --  HR: --  BP: --  BP(mean): --  RR: --  SpO2: --    Orthostatic VS  02-13-24 @ 08:04  Lying BP: --/-- HR: --  Sitting BP: 138/94 HR: 87  Standing BP: 130/82 HR: 88  Site: --  Mode: --  Orthostatic VS  02-12-24 @ 07:54  Lying BP: --/-- HR: --  Sitting BP: 124/82 HR: 60  Standing BP: 126/88 HR: 62  Site: --  Mode: --  Orthostatic VS  02-11-24 @ 20:44  Lying BP: --/-- HR: --  Sitting BP: 139/88 HR: 73  Standing BP: 140/91 HR: 72  Site: --  Mode: --    Lipid Panel: Date/Time: 02-10-24 @ 10:45  Cholesterol, Serum: 188  LDL Cholesterol Calculated: 88  HDL Cholesterol, Serum: 55  Total Cholesterol/HDL Ration Measurement: --  Triglycerides, Serum: 225  
BMI: BMI (kg/m2): 25.8 (02-08-24 @ 21:31)  HbA1c: A1C with Estimated Average Glucose Result: 5.5 % (02-10-24 @ 10:45)    Glucose:   BP: --Vital Signs Last 24 Hrs  T(C): 36.2 (02-22-24 @ 07:48), Max: 36.2 (02-22-24 @ 07:48)  T(F): 97.2 (02-22-24 @ 07:48), Max: 97.2 (02-22-24 @ 07:48)  HR: --  BP: --  BP(mean): --  RR: --  SpO2: --    Orthostatic VS  02-22-24 @ 07:48  Lying BP: --/-- HR: --  Sitting BP: 132/95 HR: 69  Standing BP: 142/86 HR: 68  Site: --  Mode: --  Orthostatic VS  02-21-24 @ 08:35  Lying BP: --/-- HR: --  Sitting BP: 124/75 HR: 74  Standing BP: 126/81 HR: 78  Site: --  Mode: --    Lipid Panel: Date/Time: 02-10-24 @ 10:45  Cholesterol, Serum: 188  LDL Cholesterol Calculated: 88  HDL Cholesterol, Serum: 55  Total Cholesterol/HDL Ration Measurement: --  Triglycerides, Serum: 225  
BMI: BMI (kg/m2): 25.8 (02-08-24 @ 21:31)  HbA1c: A1C with Estimated Average Glucose Result: 5.5 % (02-10-24 @ 10:45)    Glucose:   BP: --Vital Signs Last 24 Hrs  T(C): 36.6 (02-21-24 @ 08:35), Max: 36.6 (02-21-24 @ 08:35)  T(F): 97.8 (02-21-24 @ 08:35), Max: 97.8 (02-21-24 @ 08:35)  HR: --  BP: --  BP(mean): --  RR: --  SpO2: --    Orthostatic VS  02-21-24 @ 08:35  Lying BP: --/-- HR: --  Sitting BP: 124/75 HR: 74  Standing BP: 126/81 HR: 78  Site: --  Mode: --  Orthostatic VS  02-20-24 @ 07:48  Lying BP: --/-- HR: --  Sitting BP: 134/80 HR: 63  Standing BP: 149/90 HR: 64  Site: --  Mode: --    Lipid Panel: Date/Time: 02-10-24 @ 10:45  Cholesterol, Serum: 188  LDL Cholesterol Calculated: 88  HDL Cholesterol, Serum: 55  Total Cholesterol/HDL Ration Measurement: --  Triglycerides, Serum: 225  
BMI: BMI (kg/m2): 25.8 (02-08-24 @ 21:31)  HbA1c: A1C with Estimated Average Glucose Result: 5.5 % (02-10-24 @ 10:45)    Glucose:   BP: --Vital Signs Last 24 Hrs  T(C): 36.3 (02-23-24 @ 07:41), Max: 36.3 (02-23-24 @ 07:41)  T(F): 97.4 (02-23-24 @ 07:41), Max: 97.4 (02-23-24 @ 07:41)  HR: --  BP: --  BP(mean): --  RR: --  SpO2: --    Orthostatic VS  02-23-24 @ 07:41  Lying BP: --/-- HR: --  Sitting BP: 132/90 HR: 59  Standing BP: 127/86 HR: 60  Site: --  Mode: --  Orthostatic VS  02-22-24 @ 07:48  Lying BP: --/-- HR: --  Sitting BP: 132/95 HR: 69  Standing BP: 142/86 HR: 68  Site: --  Mode: --    Lipid Panel: Date/Time: 02-10-24 @ 10:45  Cholesterol, Serum: 188  LDL Cholesterol Calculated: 88  HDL Cholesterol, Serum: 55  Total Cholesterol/HDL Ration Measurement: --  Triglycerides, Serum: 225  
BMI: BMI (kg/m2): 25.8 (02-08-24 @ 21:31)  HbA1c: A1C with Estimated Average Glucose Result: 5.5 % (02-10-24 @ 10:45)    Glucose:   BP: --Vital Signs Last 24 Hrs  T(C): 36.2 (02-15-24 @ 08:28), Max: 36.2 (02-15-24 @ 08:28)  T(F): 97.2 (02-15-24 @ 08:28), Max: 97.2 (02-15-24 @ 08:28)  HR: --  BP: --  BP(mean): --  RR: --  SpO2: --    Orthostatic VS  02-15-24 @ 08:28  Lying BP: --/-- HR: --  Sitting BP: 138/82 HR: 57  Standing BP: 136/84 HR: 56  Site: --  Mode: --  Orthostatic VS  02-14-24 @ 08:18  Lying BP: --/-- HR: --  Sitting BP: 151/91 HR: 64  Standing BP: 143/90 HR: 60  Site: --  Mode: --  Orthostatic VS  02-13-24 @ 20:31  Lying BP: --/-- HR: --  Sitting BP: 138/78 HR: 66  Standing BP: 135/84 HR: 68  Site: --  Mode: --    Lipid Panel: Date/Time: 02-10-24 @ 10:45  Cholesterol, Serum: 188  LDL Cholesterol Calculated: 88  HDL Cholesterol, Serum: 55  Total Cholesterol/HDL Ration Measurement: --  Triglycerides, Serum: 225  
BMI: BMI (kg/m2): 25.8 (02-08-24 @ 21:31)  HbA1c: A1C with Estimated Average Glucose Result: 5.5 % (02-10-24 @ 10:45)    Glucose:   BP: --Vital Signs Last 24 Hrs  T(C): --  T(F): --  HR: --  BP: --  BP(mean): --  RR: --  SpO2: --    Orthostatic VS  02-15-24 @ 08:28  Lying BP: --/-- HR: --  Sitting BP: 138/82 HR: 57  Standing BP: 136/84 HR: 56  Site: --  Mode: --    Lipid Panel: Date/Time: 02-10-24 @ 10:45  Cholesterol, Serum: 188  LDL Cholesterol Calculated: 88  HDL Cholesterol, Serum: 55  Total Cholesterol/HDL Ration Measurement: --  Triglycerides, Serum: 225  
BMI: BMI (kg/m2): 25.8 (02-08-24 @ 21:31)  HbA1c: A1C with Estimated Average Glucose Result: 5.5 % (02-10-24 @ 10:45)    Glucose:   BP: --Vital Signs Last 24 Hrs  T(C): 36.8 (02-13-24 @ 20:31), Max: 36.8 (02-13-24 @ 20:31)  T(F): 98.2 (02-13-24 @ 20:31), Max: 98.2 (02-13-24 @ 20:31)  HR: --  BP: --  BP(mean): --  RR: --  SpO2: 99% (02-13-24 @ 20:31) (99% - 99%)    Orthostatic VS  02-14-24 @ 08:18  Lying BP: --/-- HR: --  Sitting BP: 151/91 HR: 64  Standing BP: 143/90 HR: 60  Site: --  Mode: --  Orthostatic VS  02-13-24 @ 20:31  Lying BP: --/-- HR: --  Sitting BP: 138/78 HR: 66  Standing BP: 135/84 HR: 68  Site: --  Mode: --  Orthostatic VS  02-13-24 @ 08:04  Lying BP: --/-- HR: --  Sitting BP: 138/94 HR: 87  Standing BP: 130/82 HR: 88  Site: --  Mode: --    Lipid Panel: Date/Time: 02-10-24 @ 10:45  Cholesterol, Serum: 188  LDL Cholesterol Calculated: 88  HDL Cholesterol, Serum: 55  Total Cholesterol/HDL Ration Measurement: --  Triglycerides, Serum: 225  
BMI: BMI (kg/m2): 25.8 (02-08-24 @ 21:31)  HbA1c: A1C with Estimated Average Glucose Result: 5.5 % (02-10-24 @ 10:45)    Glucose:   BP: 126/83 (02-08-24 @ 20:28) (125/91 - 126/83)Vital Signs Last 24 Hrs  T(C): 36.7 (02-10-24 @ 07:43), Max: 36.7 (02-10-24 @ 07:43)  T(F): 98 (02-10-24 @ 07:43), Max: 98 (02-10-24 @ 07:43)  HR: 66 (02-10-24 @ 09:00) (66 - 66)  BP: --  BP(mean): --  RR: --  SpO2: --    Orthostatic VS  02-10-24 @ 07:43  Lying BP: --/-- HR: --  Sitting BP: 136/89 HR: 58  Standing BP: 145/90 HR: 58  Site: --  Mode: --  Orthostatic VS  02-09-24 @ 21:05  Lying BP: --/-- HR: --  Sitting BP: 127/85 HR: 66  Standing BP: 129/78 HR: 62  Site: --  Mode: --  Orthostatic VS  02-09-24 @ 07:44  Lying BP: --/-- HR: --  Sitting BP: 122/78 HR: 60  Standing BP: 125/88 HR: 61  Site: --  Mode: --  Orthostatic VS  02-08-24 @ 21:31  Lying BP: --/-- HR: --  Sitting BP: 127/86 HR: 66  Standing BP: 126/76 HR: 64  Site: --  Mode: --    Lipid Panel: Date/Time: 02-10-24 @ 10:45  Cholesterol, Serum: 188  LDL Cholesterol Calculated: 88  HDL Cholesterol, Serum: 55  Total Cholesterol/HDL Ration Measurement: --  Triglycerides, Serum: 225

## 2024-02-23 NOTE — BH DISCHARGE NOTE NURSING/SOCIAL WORK/PSYCH REHAB - NSDCPRGOAL_PSY_ALL_CORE
Patient has demonstrated progress towards psychiatric rehabilitation goals during current hospitalization. Patient has demonstrated daily medication compliance and is tolerating medications well. Patient reports improvement in mood compared to admission. Patient was able to identify meditation, grounding exercises, listening to music, writing music and sketching as effective coping skills to manage symptoms. Patient reports feeling confident in ability to utilize coping skills post discharge. Patient has attended approximately 90 percent of psychiatric rehabilitation groups during current hospitalization. Patient was verbal and engaged in group discussions and activities. Patient was able to tolerate group structure and did not require redirection. Patient was visible in the milieu. Patient increasingly socialized with select peers appropriately. Patient was able to make needs known to staff. Patient has demonstrated improved insight into the current episode and was able to identify worsening depression, interpersonal conflicts with loved ones and having urges to drink as warning signs that a crisis may be developing. Patient expressed readiness for discharge. Patient and staff collaborated on safety planning prior to discharge.

## 2024-02-23 NOTE — BH PSYCHOLOGY - GROUP THERAPY NOTE - NSBHPSYCHOLGRPNAME_PSY_A_CORE
CBT (Cognitive Behavioral Therapy) Group
CBT (Cognitive Behavioral Therapy) Group
Statement Selected
CBT (Cognitive Behavioral Therapy) Group

## 2024-02-23 NOTE — BH INPATIENT PSYCHIATRY PROGRESS NOTE - NSTXSLPPATPROGRES_PSY_ALL_CORE
Met - goal discontinued
Met - goal discontinued
No Change
Met - goal discontinued
Met - goal discontinued
No Change
No Change
Met - goal discontinued
No Change

## 2024-02-23 NOTE — BH PSYCHOLOGY - GROUP THERAPY NOTE - NSBHPSYCHOLRESPCOMMENT_PSY_A_CORE FT
The patient appeared adequately groomed and casually dressed. Pt appeared very engaged in the group as evidenced by his willingness to independently verbally communicate during the discussion. Pt volunteered to read aloud from worksheet. Pt noted that admitting wrongdoing or mistakes is challenging, but that we can still validate our feelings and grow from the experience. Pt reflected on the importance of balancing work and resting, noting how overworking can lead to burnout. Lastly, Pt shared that he started out feeling helpless about being admitted to the hospital but has accepted that this is what he needs right now. Pt shared personal examples throughout. Speech WNL. PT was oriented X3. Pt was appropriate and supportive with peers.
The patient appeared adequately groomed and casually dressed. Pt appeared very engaged in the group as evidenced by his willingness to independently verbally communicate during the discussion. Pt volunteered to read aloud from worksheet. Pt shared that when he was younger, he didn’t value education and gaining knowledge as much as he does now. Pt also expressed that his family values financial stability and career success, which at times clashes with Pt’s desire to be creative/artistic and experiment with music. Pt shared thoughtful and personal examples throughout. Speech WNL. PT was oriented X3. Pt was appropriate and supportive with peers.
The patient appeared adequately groomed and casually dressed. Pt appeared very engaged in the group as evidenced by his willingness to verbally communicate during the discussion. He discussed two problems he was facing (managing staying sober while living with his mother, as well as reconciling with his wife) and reflected on the different steps he has taken to analyze and solve the problem, as well as feeling he has developed better coping skills to achieve certain goals such as remaining sober and engaging in substance use tx. Speech was WNL. PT was oriented X3. Pt was appropriate with peers. 
The patient appeared adequately groomed and casually dressed. Pt appeared very engaged in the group as evidenced by his willingness to verbally communicate during the discussion. He reflected on how unhealthy coping mechanisms such as using substances, using his social media as distraction and his unhealthy eating habits have impacted his sleep in the past, as well as how he is working on developing healthier habits that feel more sustainable. Pt shared how adjusting his sleep environment by using dark out shades, as well as a sound machine has helped him improve his sleep. Speech was WNL. PT was oriented X3. Pt was appropriate with peers. 
The patient appeared adequately groomed and casually dressed. Pt appeared very engaged in the group as evidenced by his willingness to verbally communicate during the discussion. He reflected on his experience in the mindfulness exercise and was attentive while watching the video. The patient shared that he would like to fill the world with empathy. He commented on the videos by relating them to his personal experience of allowing different thoughts to pass by, whether they were positive or negative. He stepped out of the group briefly to talk to his provider but remained engaged in the group most of the time. Speech was WNL. PT was oriented X3. Pt was appropriate with peers. 
The patient appeared adequately groomed and casually dressed. Pt appeared very engaged in the group as evidenced by their willingness to independently verbally communicate during the discussion. Pt discussed how his responsibilities often get in the way of him taking care of himself, as well as how he hopes to focus on his substance use recovery after being discharged, as he needs to prioritize being healthy. Pt set himself a goal of waking after each meal. Pt was oriented X3. Pt was appropriate with peers.

## 2024-02-23 NOTE — BH INPATIENT PSYCHIATRY PROGRESS NOTE - NSTXIMPULSDATEEST_PSY_ALL_CORE
09-Feb-2024

## 2024-02-23 NOTE — BH PSYCHOLOGY - GROUP THERAPY NOTE - NSBHPSYCHOLGRPTYPE_PSY_A_CORE
Cognitive Behavioral Coping Skills

## 2024-02-23 NOTE — BH INPATIENT PSYCHIATRY PROGRESS NOTE - NSTXALCDRGDATEEST_PSY_ALL_CORE
08-Feb-2024

## 2024-02-23 NOTE — BH INPATIENT PSYCHIATRY PROGRESS NOTE - NSBHATTESTCOMMENTATTENDFT_PSY_A_CORE
As per staff report, pt has been appropriate in behavior and linear in thought process on unit and in groups/activities.  He has not exhibited any psychosis throughout weekend.  High likelihood of substance induced mood disorder given rapid reconstitution on minimal doses of psychotropic.  Continue meds as ordered.  Unclear dispo as pt is refusing shelter and otherwise has no other residence available at this time.
Pt remains stable.  Awaiting Medicaid and inpt rehab referrals.  Continue meds as ordered.
Patient seen by me, chart reviewed, and case discussed with treatment team.  Reviewed and agree with above progress note, assessment and plan; corrections and modification made where appropriate.  The patient continues to maintain gains, states he is benefiting from groups and coping skills.  Tolerating medications well, will continue.  Disposition planning.
Continues to be stable, motivated for mental health/substance use treatment.  Continue meds.  Dispo planning.
Pt continues to be stable without moni or psychosis.  Naltrexone trial.
Pt is psychiatrically stable.  Continue medications as ordered.  Dispo planning.
Pt today continues to be stable, no moni or psychosis.  Pt declines inpt rehab today.  Is amenable to dual dx clinic follow up.  Declines Vivitrol option, wants to remain on naltrexone.  Continue meds as ordered.

## 2024-02-23 NOTE — BH PSYCHOLOGY - GROUP THERAPY NOTE - NSPSYCHOLGRPCOGGOAL_PSY_A_CORE FT
Decrease symptoms, Develop coping/emotion regulation skills, Psychoeducation    

## 2024-02-23 NOTE — BH INPATIENT PSYCHIATRY PROGRESS NOTE - NSTXALCDRGDATETRGT_PSY_ALL_CORE
15-Feb-2024
15-Feb-2024
29-Feb-2024
15-Feb-2024
29-Feb-2024
15-Feb-2024

## 2024-02-23 NOTE — BH PSYCHOLOGY - GROUP THERAPY NOTE - NSPSYCHOLGRPCOGINT_PSY_A_CORE
group members provided support/group members suggested positive behaviors/mindfulness skills taught/relaxation skills practiced/other..

## 2024-02-23 NOTE — BH DISCHARGE NOTE NURSING/SOCIAL WORK/PSYCH REHAB - NSBHDCAGENCY1FT_PSY_A_CORE
[facility] ([facility]  Gretel Sanders LCSW at [facility] ([facility])

## 2024-02-23 NOTE — BH DISCHARGE NOTE NURSING/SOCIAL WORK/PSYCH REHAB - NSDCADDINFO1FT_PSY_ALL_CORE
This is an in person appointment.  Please note, this is an in person appointment. Also, you were applied for Medicaid and your application is currently pending. You may follow up with Roslyn Mckeon [employer] Medicaid Investigator at [phone] with any questions. Thank you.

## 2024-02-23 NOTE — BH INPATIENT PSYCHIATRY PROGRESS NOTE - NSICDXBHPRIMARYDX_PSY_ALL_CORE
Mood disorder   F39  
Mood disorder   F39  
Bipolar disorder   F31.9  
Bipolar disorder   F31.9  
Mood disorder   F39  
Bipolar disorder   F31.9  
Mood disorder   F39

## 2024-02-23 NOTE — BH INPATIENT PSYCHIATRY PROGRESS NOTE - NSBHATTESTBILLING_PSY_A_CORE
22010-Luobexuxft OBS or IP - moderate complexity OR 35-49 mins
60186-Ferjcpcuyx OBS or IP - moderate complexity OR 35-49 mins
45938-Nyqjdmuaew OBS or IP - moderate complexity OR 35-49 mins
53094-Ockridlwqt OBS or IP - moderate complexity OR 35-49 mins
71670-Seseyexopu OBS or IP - moderate complexity OR 35-49 mins
63990-Gjxggcrqwj OBS or IP - moderate complexity OR 35-49 mins
75176-Dwvmxgvcgv OBS or IP - moderate complexity OR 35-49 mins
26960-Yszslonxqp OBS or IP - low complexity OR 25-34 mins
08608-Wqlmobjnwx OBS or IP - moderate complexity OR 35-49 mins
88826-Gufmbuyabb OBS or IP - moderate complexity OR 35-49 mins
37814-Vdfwbdowax OBS or IP - moderate complexity OR 35-49 mins

## 2024-02-23 NOTE — BH INPATIENT PSYCHIATRY PROGRESS NOTE - MSE UNSTRUCTURED FT
Appearance: Dressed appropriately with good hygiene   Behavior: Cooperative, good eye contact  Motor: No abnormal movements, no psychomotor slowing or activation.  Speech: Regular rate and volume  Mood: "pretty good"  Affect: Neutral.  Thought Process: Linear, goal-oriented  Thought Content: Denies AVH.  Denies SIIP or HIIP.  Insight: Limited.  Judgment: Fair  Attention: Fair.  Language: Fluent.  Gait: Intact.

## 2024-02-23 NOTE — BH INPATIENT PSYCHIATRY PROGRESS NOTE - NSTXDCOPLKINTERMD_PSY_ALL_CORE
med management, started on Lamictal and Olanzapine, titrate to efficacy/ group, milieu therapy

## 2024-02-23 NOTE — BH INPATIENT PSYCHIATRY PROGRESS NOTE - NSTXSLPPATGOAL_PSY_ALL_CORE
Be able to sleep for a minimum of six hours daily

## 2024-02-23 NOTE — BH PSYCHOLOGY - GROUP THERAPY NOTE - NSBHPSYCHOLASSESSPROV_PSY_A_CORE
Licensed Psychologist and Psychology Trainee
Licensed Psychologist and Psychology Trainee
Licensed Psychologist
Licensed Psychologist
Licensed Psychologist and Psychology Trainee
Psychology Trainee only

## 2024-02-23 NOTE — BH INPATIENT PSYCHIATRY PROGRESS NOTE - NSCGISEVERILLNESS_PSY_ALL_CORE
6 = Severely ill - disruptive pathology, behavior and function are frequently influenced by symptoms, may require assistance from others
6 = Severely ill - disruptive pathology, behavior and function are frequently influenced by symptoms, may require assistance from others
4 = Moderately ill – overt symptoms causing noticeable, but modest, functional impairment or distress; symptom level may warrant medication
4 = Moderately ill – overt symptoms causing noticeable, but modest, functional impairment or distress; symptom level may warrant medication

## 2024-02-23 NOTE — BH DISCHARGE NOTE NURSING/SOCIAL WORK/PSYCH REHAB - PATIENT PORTAL LINK FT
You can access the FollowMyHealth Patient Portal offered by [facility] by registering at the following website: [email or web address]. By joining [facility]’s FollowMyHealth portal, you will also be able to view your health information using other applications (apps) compatible with our system.

## 2024-02-23 NOTE — BH DISCHARGE NOTE NURSING/SOCIAL WORK/PSYCH REHAB - NSCDUDCCRISIS_PSY_A_CORE
.National Suicide Prevention Lifeline 5 [phone] 199-2365/.  [facility] Helpline / [facility]  [phone] ([phone])/.  [facility]  [address] [place], [place] [address] [phone]   Hours:  Monday through Friday from 9 AM to 3 PM

## 2024-02-23 NOTE — BH PSYCHOLOGY - GROUP THERAPY NOTE - TOKEN PULL-DIAGNOSIS
Primary Diagnosis:  Mood disorder [F39]      Bipolar disorder [F31.9]        Problem Dx:   Alcohol use disorder [F10.90]      Cannabis use disorder [F12.90]      
Primary Diagnosis:  Bipolar disorder [F31.9]        Problem Dx:   Alcohol use disorder [F10.90]      Cannabis use disorder [F12.90]      Mood disorder [F39]      
Primary Diagnosis:  Mood disorder [F39]      Bipolar disorder [F31.9]        Problem Dx:   Alcohol use disorder [F10.90]      Cannabis use disorder [F12.90]      
Primary Diagnosis:  Mood disorder [F39]      Bipolar disorder [F31.9]        Problem Dx:   Alcohol use disorder [F10.90]      Cannabis use disorder [F12.90]

## 2024-02-23 NOTE — BH PSYCHOLOGY - GROUP THERAPY NOTE - NSPSYCHOLGRPBILLING_PSY_A_CORE
[id number] - Group Psychotherapy
71784 - Group Psychotherapy
96026 - Group Psychotherapy
69831 - Group Psychotherapy
36152 - Group Psychotherapy

## 2024-02-23 NOTE — BH INPATIENT PSYCHIATRY PROGRESS NOTE - NSICDXBHSECONDARYDX_PSY_ALL_CORE
Cannabis use disorder   F12.90  Alcohol use disorder   F10.90  

## 2024-02-23 NOTE — BH INPATIENT PSYCHIATRY PROGRESS NOTE - NSTXSLPPATDATETRGT_PSY_ALL_CORE
16-Feb-2024
16-Feb-2024
06-Mar-2024
16-Feb-2024
06-Mar-2024
16-Feb-2024
16-Feb-2024

## 2024-02-23 NOTE — BH PSYCHOLOGY - GROUP THERAPY NOTE - NSPSYCHOLGRPCOGINT_PSY_A_CORE FT
Cognitive/behavioral therapy, Acceptance and Commitment therapy, Emotion regulation/coping skills taught, Psychoed 

## 2024-02-23 NOTE — BH INPATIENT PSYCHIATRY PROGRESS NOTE - NSBHFUPINTERVALCCFT_PSY_A_CORE
PT dc'd home with prescriptions for nausea and eye drops, pt encouraged to f/u with eye doctor and pcp, copy of lab studies provided to pt, offered to call pt's daughter to discuss plan of care and pt refused.  Pt contacted cab company and was brought to  in wheelchair to await them.  Opportunity provided to ask questions, all questions answered.  
Seen for follow up of disorganization.
I'm feeling a little better. 	
Seen for follow up of disorganization.
I'm feeling a little better. 	
I'm feeling better. 	
Seen for follow up of disorganization.

## 2024-02-23 NOTE — BH INPATIENT PSYCHIATRY PROGRESS NOTE - NSTXIMPULSDATETRGT_PSY_ALL_CORE
16-Feb-2024
06-Mar-2024
16-Feb-2024
06-Mar-2024
16-Feb-2024

## 2024-02-23 NOTE — BH INPATIENT PSYCHIATRY PROGRESS NOTE - NSTXIMPULSPROGRES_PSY_ALL_CORE
Met - goal discontinued
Met - goal discontinued
No Change
Met - goal discontinued
No Change

## 2024-02-23 NOTE — BH INPATIENT PSYCHIATRY PROGRESS NOTE - NSTXDCOPLKDATETRGT_PSY_ALL_CORE
16-Feb-2024
16-Feb-2024
06-Mar-2024
16-Feb-2024
16-Feb-2024
23-Feb-2024
16-Feb-2024
06-Mar-2024
16-Feb-2024
16-Feb-2024
23-Feb-2024

## 2024-02-23 NOTE — BH INPATIENT PSYCHIATRY PROGRESS NOTE - NSTXALCDRGGOAL_PSY_ALL_CORE
Score on withdrawal scale will be WNL

## 2024-02-23 NOTE — BH INPATIENT PSYCHIATRY PROGRESS NOTE - NSCGIIMPROVESX_PSY_ALL_CORE
1 - Very much improved - nearly all better; good level of functioning; minimal symptoms; represents a very substantial change
2 = Much improved - notably better with signficant reduction of symptoms; increase in the level of functioning but some symptoms remain

## 2024-02-23 NOTE — BH INPATIENT PSYCHIATRY PROGRESS NOTE - NSICDXBHTERTIARYDX_PSY_ALL_CORE
R/O Substance induced mood disorder   F19.94  

## 2024-02-23 NOTE — BH INPATIENT PSYCHIATRY PROGRESS NOTE - CURRENT MEDICATION
MEDICATIONS  (STANDING):  atorvastatin 20 milliGRAM(s) Oral at bedtime  clopidogrel Tablet 75 milliGRAM(s) Oral daily  influenza   Vaccine 0.5 milliLiter(s) IntraMuscular once  lamoTRIgine 25 milliGRAM(s) Oral daily  metoprolol succinate ER 25 milliGRAM(s) Oral daily  naltrexone 50 milliGRAM(s) Oral daily  OLANZapine 5 milliGRAM(s) Oral at bedtime  polyethylene glycol 3350 17 Gram(s) Oral two times a day    MEDICATIONS  (PRN):  hemorrhoidal Ointment 1 Application(s) Rectal four times a day PRN hemorrhoids  hydrOXYzine hydrochloride 50 milliGRAM(s) Oral every 6 hours PRN anxiety/agitation  ibuprofen  Tablet. 400 milliGRAM(s) Oral every 6 hours PRN Moderate Pain (4 - 6)  lidocaine 5% Ointment 1 Application(s) Topical four times a day PRN rectal pain  LORazepam   Injectable 2 milliGRAM(s) IntraMuscular once PRN agitation  melatonin. 3 milliGRAM(s) Oral at bedtime PRN Insomnia

## 2024-02-23 NOTE — BH INPATIENT PSYCHIATRY PROGRESS NOTE - NSBHASSESSSUMMFT_PSY_ALL_CORE
Patient is 48 years old M,  from wife since 11/23, has 15 year daughter who lives with wife, unemployed after being let go from job at , has been living with mother and brother last 3 months, currently evicted. No formal psychiatric history or inpatient admissions. No suicide attempts. + history of substance use- alcohol and cannabis use. Last drank a bottle of 350ml Bacardi yesterday. He was recently discharged from detox in [place] 3 weeks ago. PMH of congenital heart disease, HTN. BIBA referred from crisis center for disorganization and reported aggression at home, admitted to [facility] for concern for bipolar disorder vs substance induced mood disorder.  Likely the latter, given rapid reconstitution on relatively low doses of psychotropic.    Continues to be psychiatrically stable.     Plan:   Legal: 9.39  Safety: routine observation. Denies SI/HI/I/P on unit.   	PRn: haldol 5 mg/Ativan 2 mg/Benadryl 50 mg IM/PO prn for agitation  Psychiatry: continue olanzapine and lamotrigine.  Naltrexone 50 mg PO daily.  Pt declined Vivitrol.  Group, milieu, individual therapy as appropriate.  Medical: PMH of congenital heart disease:   	continue with metoprolol succinate ER 25 mg PO/ clopidogrel 75 mg PO/ atorvastatin 20 mg PO  	melatonin 3 mg at bedtime PRN for insomnia  	miralax BID  Dispo: Dual dx clinic.  Pt declined inpt rehab.

## 2024-02-23 NOTE — BH INPATIENT PSYCHIATRY PROGRESS NOTE - NSTXDCOPLKDATEEST_PSY_ALL_CORE
21-Feb-2024
21-Feb-2024
09-Feb-2024

## 2024-02-23 NOTE — BH PSYCHOLOGY - GROUP THERAPY NOTE - NSPSYCHOLGRPCOGPROB_PSY_A_CORE FT
Anxiety, Depression, Emotion dysregulation, Lack of coping skills     

## 2024-02-23 NOTE — BH INPATIENT PSYCHIATRY PROGRESS NOTE - NSBHFUPINTERVALHXFT_PSY_A_CORE
Patient reports looking forward to going home next week. He is focused on setting up all of his outpatient appointments and finding a new apartment once he is discharged. He is taking medications as prescribes and denies medication side effects. Knee pain is stable with Motrin.

## 2024-02-23 NOTE — BH INPATIENT PSYCHIATRY PROGRESS NOTE - NSBHCHARTREVIEWVS_PSY_A_CORE FT
Vital Signs Last 24 Hrs  T(C): --  T(F): --  HR: --  BP: --  BP(mean): --  RR: --  SpO2: --    Orthostatic VS  02-15-24 @ 08:28  Lying BP: --/-- HR: --  Sitting BP: 138/82 HR: 57  Standing BP: 136/84 HR: 56  Site: --  Mode: --  
Vital Signs Last 24 Hrs  T(C): 36.8 (02-13-24 @ 20:31), Max: 36.8 (02-13-24 @ 20:31)  T(F): 98.2 (02-13-24 @ 20:31), Max: 98.2 (02-13-24 @ 20:31)  HR: --  BP: --  BP(mean): --  RR: --  SpO2: 99% (02-13-24 @ 20:31) (99% - 99%)    Orthostatic VS  02-14-24 @ 08:18  Lying BP: --/-- HR: --  Sitting BP: 151/91 HR: 64  Standing BP: 143/90 HR: 60  Site: --  Mode: --  Orthostatic VS  02-13-24 @ 20:31  Lying BP: --/-- HR: --  Sitting BP: 138/78 HR: 66  Standing BP: 135/84 HR: 68  Site: --  Mode: --  Orthostatic VS  02-13-24 @ 08:04  Lying BP: --/-- HR: --  Sitting BP: 138/94 HR: 87  Standing BP: 130/82 HR: 88  Site: --  Mode: --  
Vital Signs Last 24 Hrs  T(C): 36.3 (02-20-24 @ 07:48), Max: 36.3 (02-20-24 @ 07:48)  T(F): 97.3 (02-20-24 @ 07:48), Max: 97.3 (02-20-24 @ 07:48)  HR: --  BP: --  BP(mean): --  RR: --  SpO2: --    Orthostatic VS  02-20-24 @ 07:48  Lying BP: --/-- HR: --  Sitting BP: 134/80 HR: 63  Standing BP: 149/90 HR: 64  Site: --  Mode: --  Orthostatic VS  02-19-24 @ 08:15  Lying BP: --/-- HR: --  Sitting BP: 129/88 HR: 71  Standing BP: 136/90 HR: 77  Site: --  Mode: --  
Vital Signs Last 24 Hrs  T(C): 36.2 (02-11-24 @ 07:55), Max: 36.5 (02-10-24 @ 20:42)  T(F): 97.1 (02-11-24 @ 07:55), Max: 97.7 (02-10-24 @ 20:42)  HR: --  BP: --  BP(mean): --  RR: --  SpO2: 98% (02-10-24 @ 20:42) (98% - 98%)    Orthostatic VS  02-11-24 @ 07:55  Lying BP: --/-- HR: --  Sitting BP: 124/90 HR: 93  Standing BP: 145/97 HR: 95  Site: --  Mode: --  Orthostatic VS  02-10-24 @ 20:42  Lying BP: --/-- HR: --  Sitting BP: 142/104 HR: 71  Standing BP: 138/89 HR: 65  Site: --  Mode: --  Orthostatic VS  02-10-24 @ 07:43  Lying BP: --/-- HR: --  Sitting BP: 136/89 HR: 58  Standing BP: 145/90 HR: 58  Site: --  Mode: --  Orthostatic VS  02-09-24 @ 21:05  Lying BP: --/-- HR: --  Sitting BP: 127/85 HR: 66  Standing BP: 129/78 HR: 62  Site: --  Mode: --  
Vital Signs Last 24 Hrs  T(C): 36.7 (02-10-24 @ 07:43), Max: 36.7 (02-10-24 @ 07:43)  T(F): 98 (02-10-24 @ 07:43), Max: 98 (02-10-24 @ 07:43)  HR: 66 (02-10-24 @ 09:00) (66 - 66)  BP: --  BP(mean): --  RR: --  SpO2: --    Orthostatic VS  02-10-24 @ 07:43  Lying BP: --/-- HR: --  Sitting BP: 136/89 HR: 58  Standing BP: 145/90 HR: 58  Site: --  Mode: --  Orthostatic VS  02-09-24 @ 21:05  Lying BP: --/-- HR: --  Sitting BP: 127/85 HR: 66  Standing BP: 129/78 HR: 62  Site: --  Mode: --  Orthostatic VS  02-09-24 @ 07:44  Lying BP: --/-- HR: --  Sitting BP: 122/78 HR: 60  Standing BP: 125/88 HR: 61  Site: --  Mode: --  Orthostatic VS  02-08-24 @ 21:31  Lying BP: --/-- HR: --  Sitting BP: 127/86 HR: 66  Standing BP: 126/76 HR: 64  Site: --  Mode: --  
Vital Signs Last 24 Hrs  T(C): 36.2 (02-22-24 @ 07:48), Max: 36.2 (02-22-24 @ 07:48)  T(F): 97.2 (02-22-24 @ 07:48), Max: 97.2 (02-22-24 @ 07:48)  HR: --  BP: --  BP(mean): --  RR: --  SpO2: --    Orthostatic VS  02-22-24 @ 07:48  Lying BP: --/-- HR: --  Sitting BP: 132/95 HR: 69  Standing BP: 142/86 HR: 68  Site: --  Mode: --  Orthostatic VS  02-21-24 @ 08:35  Lying BP: --/-- HR: --  Sitting BP: 124/75 HR: 74  Standing BP: 126/81 HR: 78  Site: --  Mode: --  
Vital Signs Last 24 Hrs  T(C): 36.6 (02-12-24 @ 07:54), Max: 36.6 (02-12-24 @ 07:54)  T(F): 97.9 (02-12-24 @ 07:54), Max: 97.9 (02-12-24 @ 07:54)  HR: --  BP: --  BP(mean): --  RR: --  SpO2: --    Orthostatic VS  02-12-24 @ 07:54  Lying BP: --/-- HR: --  Sitting BP: 124/82 HR: 60  Standing BP: 126/88 HR: 62  Site: --  Mode: --  Orthostatic VS  02-11-24 @ 20:44  Lying BP: --/-- HR: --  Sitting BP: 139/88 HR: 73  Standing BP: 140/91 HR: 72  Site: --  Mode: --  Orthostatic VS  02-11-24 @ 07:55  Lying BP: --/-- HR: --  Sitting BP: 124/90 HR: 93  Standing BP: 145/97 HR: 95  Site: --  Mode: --  Orthostatic VS  02-10-24 @ 20:42  Lying BP: --/-- HR: --  Sitting BP: 142/104 HR: 71  Standing BP: 138/89 HR: 65  Site: --  Mode: --  
Vital Signs Last 24 Hrs  T(C): 36.6 (02-21-24 @ 08:35), Max: 36.6 (02-21-24 @ 08:35)  T(F): 97.8 (02-21-24 @ 08:35), Max: 97.8 (02-21-24 @ 08:35)  HR: --  BP: --  BP(mean): --  RR: --  SpO2: --    Orthostatic VS  02-21-24 @ 08:35  Lying BP: --/-- HR: --  Sitting BP: 124/75 HR: 74  Standing BP: 126/81 HR: 78  Site: --  Mode: --  Orthostatic VS  02-20-24 @ 07:48  Lying BP: --/-- HR: --  Sitting BP: 134/80 HR: 63  Standing BP: 149/90 HR: 64  Site: --  Mode: --  
Vital Signs Last 24 Hrs  T(C): 36.2 (02-15-24 @ 08:28), Max: 36.2 (02-15-24 @ 08:28)  T(F): 97.2 (02-15-24 @ 08:28), Max: 97.2 (02-15-24 @ 08:28)  HR: --  BP: --  BP(mean): --  RR: --  SpO2: --    Orthostatic VS  02-15-24 @ 08:28  Lying BP: --/-- HR: --  Sitting BP: 138/82 HR: 57  Standing BP: 136/84 HR: 56  Site: --  Mode: --  Orthostatic VS  02-14-24 @ 08:18  Lying BP: --/-- HR: --  Sitting BP: 151/91 HR: 64  Standing BP: 143/90 HR: 60  Site: --  Mode: --  Orthostatic VS  02-13-24 @ 20:31  Lying BP: --/-- HR: --  Sitting BP: 138/78 HR: 66  Standing BP: 135/84 HR: 68  Site: --  Mode: --  
Vital Signs Last 24 Hrs  T(C): --  T(F): --  HR: --  BP: --  BP(mean): --  RR: --  SpO2: --    Orthostatic VS  02-13-24 @ 08:04  Lying BP: --/-- HR: --  Sitting BP: 138/94 HR: 87  Standing BP: 130/82 HR: 88  Site: --  Mode: --  Orthostatic VS  02-12-24 @ 07:54  Lying BP: --/-- HR: --  Sitting BP: 124/82 HR: 60  Standing BP: 126/88 HR: 62  Site: --  Mode: --  Orthostatic VS  02-11-24 @ 20:44  Lying BP: --/-- HR: --  Sitting BP: 139/88 HR: 73  Standing BP: 140/91 HR: 72  Site: --  Mode: --  
Vital Signs Last 24 Hrs  T(C): 36.3 (02-23-24 @ 07:41), Max: 36.3 (02-23-24 @ 07:41)  T(F): 97.4 (02-23-24 @ 07:41), Max: 97.4 (02-23-24 @ 07:41)  HR: --  BP: --  BP(mean): --  RR: --  SpO2: --    Orthostatic VS  02-23-24 @ 07:41  Lying BP: --/-- HR: --  Sitting BP: 132/90 HR: 59  Standing BP: 127/86 HR: 60  Site: --  Mode: --  Orthostatic VS  02-22-24 @ 07:48  Lying BP: --/-- HR: --  Sitting BP: 132/95 HR: 69  Standing BP: 142/86 HR: 68  Site: --  Mode: --

## 2024-02-23 NOTE — BH PSYCHOLOGY - GROUP THERAPY NOTE - NSPSYCHOLGRPCOGPT_PSY_A_CORE FT
Patient attended Cognitive Behavioral Therapy Group incorporating ACT-based concepts. The group started with a brief check-in asking about pt’s favorite pizza topping. The group was then engaged in a mindful exercise and a worksheet covering a DBT skill (problem solving). Group facilitator explained concepts, reinforced participation, and engaged patients in the discussion. 
Patient attended Cognitive Behavioral Therapy Group incorporating ACT-based concepts. The group started with a brief check-in, prompting participants to think about one thing they would like to change about the world. Members then engaged in a mindfulness exercise (deep breath + body scan). Afterward, the group watched two videos illustrating an ACT Metaphor and participated in a discussion regarding their thoughts and reactions to it. Group facilitator explained concepts, reinforced participation, and engaged patients in the discussion. 
Patient attended Cognitive Behavioral Therapy Group incorporating ACT-based concepts. The group started with a brief check-in asking about “something they would tell their younger self”. The group was then engaged in a mindful breathing exercise and a worksheet discussing sleep hygiene. Group facilitator explained concepts, reinforced participation, and engaged patients in the discussion. 
Patient attended Cognitive Behavioral Therapy Group incorporating ACT-based concepts. The group started with a brief check-in asking pts about a vacation they would take if they could pick any place. The group was then engaged in a mindfulness exercise, as well as a worksheet discussing self-care tips. Group facilitator explained concepts, reinforced participation, and engaged patients in the discussion. 
Patient attended Cognitive Behavioral Therapy Group incorporating ACT-based concepts. The group started with a brief check-in asking Pts to share one way they hope to take care of themselves over the weekend. Group members then engaged in a progressive muscle relaxation exercise and were encouraged to share any thoughts/reactions. Lastly, Group focused on engaging in a discussion about important opposites to balance (accepting reality and working to change it, working and resting, doing things you need to do and doing things you want to do, independence and being dependent/reliant on others, along with openness and privacy etc.); while also normalizing/validating these experiences. Group facilitator explained concepts, reinforced participation, and engaged patients in the discussion.
Patient attended Cognitive Behavioral Therapy Group incorporating ACT-based concepts. The group started with a brief check-in asking Pts to share the best gift they had ever given and/or received. Group members engaged in a progressive muscle relaxation exercise and were encouraged to share their thoughts/reactions to this. Lastly, Group focused on engaging in a discussion about their values, how these values have changed throughout different stages of life/how they differ from others/society, what they value about others, and how sometimes our actions do not align with our values/the impacts of this. Group facilitator explained concepts, reinforced participation, and engaged patients in the discussion.

## 2024-02-23 NOTE — BH DISCHARGE NOTE NURSING/SOCIAL WORK/PSYCH REHAB - NSDCPRRECOMMEND_PSY_ALL_CORE
Psychiatric rehabilitation staff recommends that patient continue to demonstrate daily medication compliance and utilize identified coping skills to manage symptoms. Patient would benefit from attending [facility] for ongoing medication management, support and psychotherapy.

## 2024-02-23 NOTE — BH INPATIENT PSYCHIATRY PROGRESS NOTE - NSTXALCDRGPROGRES_PSY_ALL_CORE
Met - goal discontinued
Improving
Met - goal discontinued
Met - goal discontinued
Improving

## 2024-02-23 NOTE — BH PSYCHOLOGY - GROUP THERAPY NOTE - NSBHPSYCHOLGRPNUM_PSY_A_CORE
Attempted to contact patient to schedule CPE.  Received message \"all circuits are busy, please try again later\".  Patient is on recall list.    
3
4
3
4
5
5

## 2024-02-23 NOTE — BH INPATIENT PSYCHIATRY PROGRESS NOTE - NSBHATTESTTYPEVISIT_PSY_A_CORE
Attending Only
Attending with Resident/Fellow/Student

## 2024-02-23 NOTE — BH INPATIENT PSYCHIATRY PROGRESS NOTE - NSTXSUBMISGOAL_PSY_ALL_CORE
Will verbalize 2 areas that serve as motivation for change as it pertains to addiction

## 2024-02-23 NOTE — BH INPATIENT PSYCHIATRY PROGRESS NOTE - NSTXSUBMISDATETRGT_PSY_ALL_CORE
16-Feb-2024
29-Feb-2024
16-Feb-2024

## 2024-02-23 NOTE — BH INPATIENT PSYCHIATRY PROGRESS NOTE - NSTXALCDRGINTERMD_PSY_ALL_CORE
CIWA/ psychotherapy

## 2024-02-23 NOTE — BH DISCHARGE NOTE NURSING/SOCIAL WORK/PSYCH REHAB - DISCHARGE INSTRUCTIONS AFTERCARE APPOINTMENTS
In order to check the location, date, or time of your aftercare appointment, please refer to your Discharge Instructions Document given to you upon leaving the hospital.  If you have lost the instructions please call [phone]

## 2024-02-24 RX ADMIN — CLOPIDOGREL BISULFATE 75 MILLIGRAM(S): 75 TABLET, FILM COATED ORAL at 08:32

## 2024-02-24 RX ADMIN — LAMOTRIGINE 25 MILLIGRAM(S): 25 TABLET, ORALLY DISINTEGRATING ORAL at 08:34

## 2024-02-24 RX ADMIN — POLYETHYLENE GLYCOL 3350 17 GRAM(S): 17 POWDER, FOR SOLUTION ORAL at 08:33

## 2024-02-24 RX ADMIN — Medication 25 MILLIGRAM(S): at 08:32

## 2024-02-24 RX ADMIN — OLANZAPINE 5 MILLIGRAM(S): 15 TABLET, FILM COATED ORAL at 20:40

## 2024-02-24 RX ADMIN — NALTREXONE HYDROCHLORIDE 50 MILLIGRAM(S): 50 TABLET, FILM COATED ORAL at 08:32

## 2024-02-24 RX ADMIN — Medication 3 MILLIGRAM(S): at 20:39

## 2024-02-24 RX ADMIN — ATORVASTATIN CALCIUM 20 MILLIGRAM(S): 80 TABLET, FILM COATED ORAL at 20:39

## 2024-02-25 PROCEDURE — 99238 HOSP IP/OBS DSCHRG MGMT 30/<: CPT

## 2024-02-25 RX ADMIN — Medication 3 MILLIGRAM(S): at 22:23

## 2024-02-25 RX ADMIN — NALTREXONE HYDROCHLORIDE 50 MILLIGRAM(S): 50 TABLET, FILM COATED ORAL at 09:11

## 2024-02-25 RX ADMIN — ATORVASTATIN CALCIUM 20 MILLIGRAM(S): 80 TABLET, FILM COATED ORAL at 20:57

## 2024-02-25 RX ADMIN — OLANZAPINE 5 MILLIGRAM(S): 15 TABLET, FILM COATED ORAL at 20:57

## 2024-02-25 RX ADMIN — LAMOTRIGINE 25 MILLIGRAM(S): 25 TABLET, ORALLY DISINTEGRATING ORAL at 09:10

## 2024-02-25 RX ADMIN — CLOPIDOGREL BISULFATE 75 MILLIGRAM(S): 75 TABLET, FILM COATED ORAL at 09:11

## 2024-02-25 RX ADMIN — Medication 50 MILLIGRAM(S): at 22:23

## 2024-02-25 RX ADMIN — Medication 25 MILLIGRAM(S): at 09:10

## 2024-02-25 RX ADMIN — POLYETHYLENE GLYCOL 3350 17 GRAM(S): 17 POWDER, FOR SOLUTION ORAL at 20:57

## 2024-02-25 NOTE — BH INPATIENT PSYCHIATRY DISCHARGE NOTE - NSBHFUPINTERVALHXFT_PSY_A_CORE
No weekend events. No weekend events.  Pt reports adequate sleep and appetite.  Pt is looking forward to discharge, wants to attend AA, and is planning on seeking employment.  Pt will be living with mother post discharge, pt states mother spoke with her landlord and that they are ok with it.

## 2024-02-25 NOTE — BH INPATIENT PSYCHIATRY DISCHARGE NOTE - NSBHDCHANDOFFFT_PSY_ALL_CORE
Clinical handoff including hospital course, diagnosis, and treatment was sent to: Dr. Carrasco [phone]. Clinical handoff including hospital course, diagnosis, and treatment was sent to: Gretel Sanders LCSW/Dr. Carrasco [phone].

## 2024-02-25 NOTE — BH INPATIENT PSYCHIATRY DISCHARGE NOTE - MSE UNSTRUCTURED FT
MSE on discharge:  Appearance: Dressed appropriately.  Good hygiene and grooming.   Behavior: Cooperative.  Calm.  Good eye contact.  Well related.   Motor: No abnormal movements, no psychomotor slowing or activation.  Speech: Regular rate.  Mood: "Fine."  Affect: Pleasant, full range, stable.   Thought Process: Linear.  Associations: Fair.  Thought Content:  Future oriented.  Denies AVH.  Denies SIIP or HIIP.  Insight: Improved.  Judgment: Fair on interview.  Attention: Fair.  Language: Fluent.  Gait: Intact.  Appearance: Dressed appropriately.  Good hygiene and grooming.   Behavior: Cooperative.  Calm.  Good eye contact.  Well related.   Motor: No abnormal movements, no psychomotor slowing or activation.  Speech: Regular rate.  Mood: "Good."  Affect: Pleasant, full range, stable.   Thought Process: Linear.  Associations: Fair.  Thought Content:  Future oriented.  Denies AVH.  Denies SIIP or HIIP.  Insight: Fair.  Judgment: Fair.   Attention: Fair.  Language: Fluent.  Gait: Intact.

## 2024-02-25 NOTE — BH INPATIENT PSYCHIATRY DISCHARGE NOTE - OTHER PAST PSYCHIATRIC HISTORY (INCLUDE DETAILS REGARDING ONSET, COURSE OF ILLNESS, INPATIENT/OUTPATIENT TREATMENT)
No formal psychiatric history or inpatient admissions. No suicide attempts. No current outpatient treaters. No formal psychiatric history or inpatient admissions. No suicide attempts. No current outpatient treatment.

## 2024-02-25 NOTE — BH INPATIENT PSYCHIATRY DISCHARGE NOTE - NSBHASSESSSUMMFT_PSY_ALL_CORE
Attenuated presentation.  Pt denies SIIP or HIIP and is future oriented.  Plan for discharge today.    Risk assessment on discharge: Pt has chronic risk factors of ETOH and cannabis use, separation from wife, unemployment, with acute risk factors of mood symptoms in context of ETOH use, now treated with inpatient care consisting of medication management and psychotherapeutic interventions.  Protective factors of supportive family, future orientation, therapeutic alliances.  Pt has consistently denied suicidality and homicidality, is emotionally regulated with good behavioral control, and is caring for ADLs independently.  Pt is CHRONIC risk of harm, but is NO longer an acute or imminent risk of harm to self or others, pt can be discharged home with outpatient follow up.     1. Will d/c home on current regimen.  On this admission pt declined Fannie.  2. Psychoeducation provided regarding importance of compliance with outpatient appointments and medications.  Pt declined inpatient rehab for substance use but was amenable to dual diagnosis clinic follow up.  3. Pt was advised to remain abstinent with substances.  4. Pt was advised to dial 911 or return to ER if they become danger to self or others.  Attenuated presentation.  No objective signs of mood episode or psychosis at this time.  Pt denies SIIP or HIIP and is future oriented.  Plan for discharge today.    Risk assessment on discharge: Pt has chronic risk factors of ETOH and cannabis use, separation from wife, unemployment, with acute risk factors of mood symptoms in context of ETOH use, now treated with inpatient care consisting of medication management and psychotherapeutic interventions.  Protective factors of supportive family, has child, future orientation, therapeutic alliances.  Pt has consistently denied suicidality and homicidality, is emotionally regulated with good behavioral control, and is caring for ADLs independently.  Pt is CHRONIC risk of harm, but is NO longer an acute or imminent risk of harm to self or others, pt can be discharged home with outpatient follow up.     1. Will d/c home on current regimen.  On this admission pt declined Fannie.  2. Psychoeducation provided regarding importance of compliance with outpatient appointments and medications.  Pt declined inpatient rehab for substance use but was amenable to dual diagnosis clinic follow up.  3. Pt was advised to remain abstinent with substances.  4. Pt was advised to dial 911 or return to ER if they become danger to self or others.  Attenuated presentation.  No objective signs of mood episode or psychosis at this time.  Pt denies SIIP or HIIP and is future oriented.  Plan for discharge today.    Risk assessment on discharge: Pt has chronic risk factors of ETOH and cannabis use, separation from wife, unemployment, with acute risk factors of mood symptoms in context of ETOH use, now treated with inpatient care consisting of medication management and psychotherapeutic interventions.  Protective factors of supportive family, has child, future orientation, therapeutic alliances.  Pt has consistently denied suicidality and homicidality, is emotionally regulated with good behavioral control, and is caring for ADLs independently.  Pt is CHRONIC risk of harm, but is NO longer an acute or imminent risk of harm to self or others, pt can be discharged home with outpatient follow up.     1. Will d/c to mother's home on current regimen.  Team confirmed with mother last week that pt may return to her house.  On this admission pt declined Fannie.  2. Psychoeducation provided regarding importance of compliance with outpatient appointments and medications.  Pt declined inpatient rehab for substance use but was amenable to dual diagnosis clinic follow up.  3. Pt was advised to remain abstinent with substances.  4. Pt was advised to dial 911 or return to ER if they become danger to self or others.  Attenuated presentation.  No objective signs of mood episode or psychosis at this time.  Pt denies SIIP or HIIP and is future oriented.  Plan for discharge today.    Risk assessment on discharge: Pt has chronic risk factors of ETOH and cannabis use, separation from wife, unemployment, with acute risk factors of mood symptoms in context of ETOH use, now treated with inpatient care consisting of medication management and psychotherapeutic interventions.  Protective factors of supportive family, has child, future orientation, therapeutic alliances.  Pt has consistently denied suicidality and homicidality, is emotionally regulated with good behavioral control, and is caring for ADLs independently.  Pt is CHRONIC risk of harm, but is NO longer an acute or imminent risk of harm to self or others, pt can be discharged home with outpatient follow up.     1. Will d/c to mother's home on current regimen.  Team confirmed with mother last week that pt may return to her home.  On this admission pt declined Fannie.  2. Psychoeducation provided regarding importance of compliance with outpatient appointments and medications.  Pt declined inpatient rehab for substance use but was amenable to dual diagnosis clinic follow up.  3. Pt was advised to remain abstinent with substances.  4. Pt was advised to dial 911 or return to ER if they become danger to self or others.

## 2024-02-25 NOTE — BH INPATIENT PSYCHIATRY DISCHARGE NOTE - NSDCMRMEDTOKEN_GEN_ALL_CORE_FT
atorvastatin 20 mg oral tablet: 1 tab(s) orally once a day (at bedtime)  clopidogrel 75 mg oral tablet: 1 tab(s) orally once a day  lamoTRIgine 25 mg oral tablet: 1 tab(s) orally once a day  metoprolol succinate 25 mg oral tablet, extended release: 1 tab(s) orally once a day  naltrexone 50 mg oral tablet: 1 tab(s) orally once a day  OLANZapine 5 mg oral tablet: 1 tab(s) orally once a day (at bedtime)

## 2024-02-25 NOTE — BH INPATIENT PSYCHIATRY DISCHARGE NOTE - DESCRIPTION
48 year old, recently  from wife and living with family. Previously employed as a ,  BS in Biology and minor in computer science. Born in [place] 48 year old, recently  from wife and living with family. Previously employed as a ,  BS in Biology and minor in computer science. Born in [facility].
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
## 2024-02-25 NOTE — BH INPATIENT PSYCHIATRY DISCHARGE NOTE - NSDCCPCAREPLAN_GEN_ALL_CORE_FT
PRINCIPAL DISCHARGE DIAGNOSIS  Diagnosis: Substance induced mood disorder  Assessment and Plan of Treatment: Medication management and psychotherapy      SECONDARY DISCHARGE DIAGNOSES  Diagnosis: Alcohol use disorder  Assessment and Plan of Treatment: Medication management and psychotherapy     Patient

## 2024-02-25 NOTE — BH INPATIENT PSYCHIATRY DISCHARGE NOTE - NSBHMETABOLIC_PSY_ALL_CORE_FT
BMI: BMI (kg/m2): 25.8 (02-08-24 @ 21:31)  HbA1c: A1C with Estimated Average Glucose Result: 5.5 % (02-10-24 @ 10:45)    Glucose:   BP: --Vital Signs Last 24 Hrs  T(C): 36.8 (02-25-24 @ 19:40), Max: 36.8 (02-25-24 @ 19:40)  T(F): 98.3 (02-25-24 @ 19:40), Max: 98.3 (02-25-24 @ 19:40)  HR: --  BP: --  BP(mean): --  RR: --  SpO2: --    Orthostatic VS  02-25-24 @ 07:58  Lying BP: --/-- HR: --  Sitting BP: 135/93 HR: 60  Standing BP: 134/93 HR: 60  Site: --  Mode: --  Orthostatic VS  02-24-24 @ 08:10  Lying BP: --/-- HR: --  Sitting BP: 132/84 HR: 103  Standing BP: 133/90 HR: 64  Site: --  Mode: --    Lipid Panel: Date/Time: 02-10-24 @ 10:45  Cholesterol, Serum: 188  LDL Cholesterol Calculated: 88  HDL Cholesterol, Serum: 55  Total Cholesterol/HDL Ration Measurement: --  Triglycerides, Serum: 225

## 2024-02-25 NOTE — BH INPATIENT PSYCHIATRY DISCHARGE NOTE - HOSPITAL COURSE
Pt was admitted to [facility] with mood symptoms in context of ETOH use and family discord.  While etiology of presentation was concerning for bipolar disorder, substance induced mood disorder was also highly considered, especially since pt had no prior formal psychiatric history or known family history of bipolar disorder.  Pt was started on olanzapine and lamotrigine but stabilized to baseline rapidly on relatively low doses of these psychotropics, raising likelihood of substance induced mood disorder.  Pt was also started on naltrexone for ETOH use disorder, but declined Vivitrol.  For majority of admission, pt was euthymic, linear in thought process, goal directed in behavior, future oriented, visible on unit, social with peers, attended groups and individual sessions with psychology, was compliant with meds and basic care, and maintained ADLs independently.  He considered return to inpatient rehab for substance use, however eventually declined but was amenable to dual diagnosis outpatient follow up.  Family was in agreement with discharge plan.  Given that pt was no longer an acute or imminent risk of harm to self or others, he was discharged home with outpatient follow up. Pt was admitted to [facility] with mood symptoms in context of ETOH use and family discord.  While etiology of presentation was concerning for bipolar disorder, substance induced mood disorder was also highly considered, especially since pt had no prior formal psychiatric history or known family history of bipolar disorder.  Pt was started on olanzapine and lamotrigine but stabilized to baseline rapidly on relatively low doses of these psychotropics, raising likelihood of substance induced mood disorder.  Pt was also started on naltrexone for ETOH use disorder, but declined Vivitrol.  For majority of admission, pt was euthymic, linear in thought process, goal directed in behavior, future oriented, visible on unit, social with peers, attended groups and individual sessions with psychology, was compliant with meds and basic care, and maintained ADLs independently.  He was future oriented and consistently denied suicidality and homicidality.  He considered return to inpatient rehab for substance use, however eventually declined but was amenable to dual diagnosis outpatient follow up.  Family was in agreement with discharge plan.  Given that pt was no longer an acute or imminent risk of harm to self or others, he was discharged home with outpatient follow up.

## 2024-02-25 NOTE — BH INPATIENT PSYCHIATRY DISCHARGE NOTE - NSBHFUPINTERVALCCFT_PSY_A_CORE
Discharge Progress Note Date and Time: 02-25-24 @ 20:39    Seen for follow up of mood symptoms and ETOH use disorder.

## 2024-02-26 VITALS — TEMPERATURE: 98 F

## 2024-02-26 PROCEDURE — 90832 PSYTX W PT 30 MINUTES: CPT

## 2024-02-26 RX ADMIN — CLOPIDOGREL BISULFATE 75 MILLIGRAM(S): 75 TABLET, FILM COATED ORAL at 08:20

## 2024-02-26 RX ADMIN — LAMOTRIGINE 25 MILLIGRAM(S): 25 TABLET, ORALLY DISINTEGRATING ORAL at 08:20

## 2024-02-26 RX ADMIN — NALTREXONE HYDROCHLORIDE 50 MILLIGRAM(S): 50 TABLET, FILM COATED ORAL at 08:19

## 2024-02-26 RX ADMIN — Medication 25 MILLIGRAM(S): at 08:20

## 2024-02-26 NOTE — BH PSYCHOLOGY - CLINICIAN PSYCHOTHERAPY NOTE - NSBHPSYCHOLPROBS_PSY_ALL_CORE
Impulsivity/Substance Abuse
Impulsivity/Substance Abuse
Impulsivity/Substance Abuse/Other...
Impulsivity/Substance Abuse
Impulsivity/Substance Abuse/Other...

## 2024-02-26 NOTE — BH PSYCHOLOGY - CLINICIAN PSYCHOTHERAPY NOTE - NSBHPSYCHOLNARRATIVE_PSY_A_CORE FT
Pt appeared alert and presented with good hygiene and grooming, dressed in his own clothes. Pt reported that he was feeling “good; optimistic”, demonstrating euthymic mood; making appropriate eye contact, tearful at times when reflecting on certain topics. No perceptual disturbances were reported or observed during the session. Thought process was linear and goal directed. Pt denied SI, plans, or intent, was future oriented. Pt did not endorse HI. Oriented x3. Pts’ speech was WNL. Good insight and improved judgement demonstrated.     Writer met with Pt for an individual therapy session. Session began with Pt sharing some of his journal reflections and take aways from groups. Pt shared feeling hopeful about engagement in outpatient treatment to target his alcohol use, noting some anxieties about how to respond to urges and triggers which typically lead him to drink. Pt noted how supportive the [facility] treatment community has been for him, and that calling sponsors is something he can do in the moment to cope with urges. Pt recognized the changes both he and his wife need to work on in their relationship, adding that in intense emotional states he struggles to express himself/communicate effectively. Writer introduced distress tolerance skills, describing how they can also aid in coping with high emotional states often precipitating urges. Writer and Pt discussed some helpful distraction techniques, with Pt expressing that he hopes to take more time for himself and getting involved in volunteer work/giving back to the community. Pt became tearful when talking about political/world events, and his passion for feeling connected to others as well as being there for his daughter. Pt initially noted that he felt “silly” for this emotional reaction, but when validated/normalized by Writer he was proud of his ability to tolerate his emotions and express them in a productive way (rather than avoid them). Writer provided Pt with support, validation, encouragement, and a safe space to share his thoughts and feelings. 
Pt appeared alert and presented with good hygiene and grooming, dressed in his own clothes. Pt reported that he was feeling “good; grateful”, demonstrating euthymic mood; making appropriate eye contact. No perceptual disturbances were reported or observed during the session. Thought process was linear and goal directed. Pt denied SI, plans, or intent, was future oriented. Pt did not endorse HI. Oriented x3. Pts’ speech was WNL. Good insight and improved judgement demonstrated.     Writer met with Pt for an individual therapy session. Pt noted feeling some relief after speaking with his wife, daughter, and his parents. Pt noted that they are all accepting of his return home under the stipulation that he maintain sobriety and engage in substance use treatment, which he is more than willing to do. Pt also discussed the “terms” both he and his wife have expressed in a recent conversation regarding their agreement to mend their relationship rather than separate. Pt shared that his wife needs him to work on gaining employment/getting his job back and managing his anger, and that he is requesting she work on acknowledging his feelings. Pt highlighted a moment of anger during phone call when he “felt [himself] losing [his] cool” but asked for space and to return to the discussion when he was better regulated. Writer provided verbal reinforcement, discussing additional conflict resolution skills which Pt was receptive to. Lastly, Writer provided Pt with “Next Steps” booklet, as he approaches discharge, focusing on processing change and acceptance around this next phase of his treatment.  
Pt appeared alert and presented with good hygiene and grooming, dressed in his own clothes. Pt reported that he was feeling “great” noting his excitement for discharge and demonstrating euthymic mood; making appropriate eye contact. No perceptual disturbances were reported or observed during the session. Thought process was linear and goal directed. Pt denied SI, plans, or intent, was future oriented. Pt did not endorse HI. Oriented x3. Pts’ speech was WNL. Good insight and judgement demonstrated.     Writer met with Pt for termination session, which began by reflecting on Pt’s readiness for discharge and what he feels he can take away from this hospitalization. Pt expressed that he was looking forward to “getting on with life”, adding that he doesn’t want to get ahead of himself and will take things slow over these next few days. Pt shared feeling “a bit nervous”, expressing efforts to communicate more effectively with his family (especially his wife) and prioritize his mental health. Writer and Pt reflected on unhelpful patterns of behavior (holding in his feelings, displacing/hiding his anger, avoiding conflict) and reviewed DBT interpersonal effectiveness skills to practice sharing his feelings/making requests by describing, asserting, reinforcing, mindfully asking and negotiating. Lastly, Writer and Pt reviewed skills that have been most helpful while on the unit (cognitive defusion, mindfulness, and anger management/grounding techniques), along with ways to integrate/apply them post discharge. Writer provided Pt with support, validation, encouragement, and a safe space to share his thoughts and feelings. 
Pt appeared alert and presented with good hygiene and grooming, dressed in his own clothes. Pt reported that he was feeling “alright”, demonstrating euthymic mood; making appropriate eye contact. No perceptual disturbances were reported or observed during the session. Thought process was linear and goal directed. Pt denied SI, plans, or intent, was future oriented. Pt did not endorse HI. Oriented x3. Pts’ speech was WNL. Good insight and fair judgement demonstrated.     Writer met with Pt for an individual therapy session. Session began by discussing Pt’s weekend and visit with both his mother and wife. Pt voiced their tendency to compare him to other family members, like his brother, who is “more successful” or not going through mental health/[facility] struggles. Writer and Pt explored how this impacts his self-esteem and perpetuates shame/guilt which contributes to feelings of anger. Pt agreed that at times he displaces his anger, and then regrets being “impulsive and reactive.” Writer and Pt discussed interactional patterns with regard to better understanding how to effectively communicate with his wife and mother. Pt also shared an openness and curiosity about couples' therapy and “opening up with someone who can be the .” Writer provided psychoeducation on approaches for managing anger (communicating using “I statements”, implementing relaxation/deep breathing, and finding other outlets for anger i.e. exercise/creativity). Additionally, Writer and Pt discussed being able to exert control over how he responds to others (removing himself in the moment, going to others for support, engaging in coping skills) vs. the things that he does not have control over (the behaviors, choices, and actions of others). Pt discussed his motivation to keep an anger diary, allowing Pt to document anger triggers, thoughts/feelings, responses, and consequences to gain a better understanding of patterns and ways of responding in the future. Writer provided Pt with support, validation, encouragement, and a safe space to share his thoughts and feelings. 
Pt appeared alert and presented with good hygiene and grooming, dressed in his own clothes. Pt reported that he was feeling “depressed but hopeful”, demonstrating euthymic; making appropriate eye contact. No perceptual disturbances were reported or observed during the session. Thought process was linear and goal directed. Pt denied SI, plans, or intent, was future oriented. Pt did not endorse HI. Oriented x3. Pts’ speech was rapid; WNL otherwise. Good insight and fair judgement demonstrated.     Writer met with Pt for an initial individual therapy session. Writer discussed parameters of treatment and the limits of confidentiality. Writer also focused on establishing rapport with Pt, exploring the stressors contributing to his hospitalization, and goals for treatment. Pt discussed his thoughts and reactions related to recent marital separation, sharing that he has been internalizing and “numbing” himself with alcohol to avoid sitting with feelings of sadness/hurt, inadequacy, resentment, and abandonment. Pt voiced feeling “ostracized and judged” but his extended family (particularly his in-laws). Writer and Pt explored the impacts of his [place] cultural views on drinking, divorce, and showing/expressing emotions as a man. Pt voiced that his wife has reacted “poorly” when he cries or expresses emotions intensely, recognizing that his drinking has been a “band aid” that has only exacerbated his anger. Pt reflected on his difficulty with appropriately/effectively expressing anger, noting that this is an area that he would like to work on. Writer began introducing styles of anger and understanding triggers. Writer also provided Pt with ACT wellness workbook, along with support, validation, encouragement, and a safe space to share his thoughts and feelings.

## 2024-02-26 NOTE — BH PSYCHOLOGY - CLINICIAN PSYCHOTHERAPY NOTE - NSBHPSYCHOLGOALS_PSY_A_CORE
Decrease symptoms/Improve social/vocational/coping skills/Psychoeducation/Treatment compliance
Decrease symptoms/Improve social/vocational/coping skills/Psychoeducation
Decrease symptoms/Assessment/Improve social/vocational/coping skills/Psychoeducation
Decrease symptoms/Improve social/vocational/coping skills/Psychoeducation
Decrease symptoms/Improve social/vocational/coping skills/Psychoeducation/Treatment compliance

## 2024-02-26 NOTE — BH PSYCHOLOGY - CLINICIAN PSYCHOTHERAPY NOTE - NSTXIMPULSGOAL_PSY_ALL_CORE
Will be able to demonstrate the ability to pause before acting out negatively

## 2024-02-26 NOTE — BH PSYCHOLOGY - CLINICIAN PSYCHOTHERAPY NOTE - NSBHPSYCHOLINT_PSY_A_CORE
Cognitive/behavioral therapy/Dialectical  Behavioral Therapy (DBT)/Dynamic issues addressed/Supported coping skills/Supportive therapy/Treatment compliance encouraged
Cognitive/behavioral therapy/Dialectical  Behavioral Therapy (DBT)/Dynamic issues addressed/Supported coping skills/Supportive therapy/Treatment compliance encouraged
Cognitive/behavioral therapy/Dialectical  Behavioral Therapy (DBT)/Dynamic issues addressed/Supported coping skills/Supportive therapy/other...
Cognitive/behavioral therapy/Dialectical  Behavioral Therapy (DBT)/Dynamic issues addressed/Supported coping skills/Supportive therapy
Cognitive/behavioral therapy/Dialectical  Behavioral Therapy (DBT)/Dynamic issues addressed/Supported coping skills/Supportive therapy/other...

## 2024-02-26 NOTE — BH PSYCHOLOGY - CLINICIAN PSYCHOTHERAPY NOTE - NSBHPSYCHOLRESPONSE_PSY_A_CORE
PROCEDURAL PRE-SEDATION ASSESSMENT      Procedure date: 7/16/2020      Indication for procedure/Pre-diagnosis:  Thoracic area pain    Planned Procedure:  Thoracic T9-!0 montserrat    CHIEF COMPLAINT  No chief complaint on file.      MEDICAL HISTORY   Significant medical/surgical history: NO  Significant family history: NO  Smoking history: Reviewed  Alcohol/drug abuse: Reviewed  Anesthesia history: Reviewed  Family anesthesia history: Reviewed  Possible pregnancy (LMP): NO  Airway risk history (sleep apnea, stridor, snoring, neck arthritis):NO  Previous complications: None  Cardiac history: NO  Respiratory history: NO    PHYSICAL EXAM  Heart: NORMAL findings  Lungs: NORMAL findings  Neuro: NORMAL findings  Vascular: NORMAL findings    OTHER FINDINGS  Reviewed current medications and allergies: YES  Reviewed pertinent lab/diagnostic tests: YES    RISK STATUS: ASA 2 Normal patient with mild systemic disease    AIRWAY ASSESSMENT: Mallampati Class II - Soft palate uvula fauces pillars visible.  No difficulty.    PLAN FOR SEDATION: IV Sedation    EKG Monitoring: YES    REASSESSMENT IMMEDIATELY PRIOR TO PROCEDURE:   Patient remains a candidate for the planned sedation and procedure.    Vitals:    07/16/20 1546   BP: (!) 159/88   Pulse: 64   Resp: 16   Temp:        Relevant updated exam, including heart, lungs, neurological and vascular was conducted and  H&P reviewed. There are no relevant changes with regards to planned course of treatment. I have personally reviewed the latest vitals signs. Procedure was explained again, risks/benefits and alternatives were discussed.This patient is accepted for procedure/surgery. Patient agreed to proceed.  Cain Covington was advised that COVID-19  is a new virus.  Patient aware that clinical presentations range from no/mild symptoms to life-threatening complications and possible death.  Patient has been advised that certain behaviors/factors increase their risk of manolo the disease.  
Patient is possible candidate for procedure utilizing steroid medication.  Patient advised that currently there is no conclusive evidence that injected steroids have any positive or negative effect on COVID-19 disease course.  There is evidence that high doses of steroids can have a negative effect on immune system which could negatively impact disease course if COVID-19 was contracted.  Patient advised that therapeutic steroid dose for injections is generally low and that the lowest effective therapeutic dose will be used.  The risks, benefits and alternatives were discussed with patient. Additionally patient was advised of increased risk of COVID-19 exposure due to contact with communal spaces and additional persons.   Cain Covington  elected to with Proceed the procedure.      Assessing Physician: Kelvin Amezcua MD                         Time: 4:14 PM    
Coping skills acquired/Insight displayed/Accepted support

## 2024-02-26 NOTE — BH PSYCHOLOGY - CLINICIAN PSYCHOTHERAPY NOTE - NSBHPSYCHOLBILLFAM_PSY_A_CORE
61781 - 16 to 37 minutes
[id number] - 16 to 37 minutes
98915 - 16 to 37 minutes
[id number] - 16 to 37 minutes
[id number] - 16 to 37 minutes

## 2024-02-26 NOTE — BH CHART NOTE - NSEVENTNOTEFT_PSY_ALL_CORE
Pt today is looking forward to discharge, is future oriented, denies any suicidality or homicidality, and is caring for ADLs independently.  Pt is no longer an acute or imminent risk of harm to self or others.  Will plan for discharge today.  Refer to  Inpatient Psychiatry Discharge Note filed on 2/25/2024 for final 2/26/2024 progress note.
Spoke with pt's mother Abbe Abelum ([phone]) at pt's request.  Mother confirms pt may come and live with her after discharge as long as he has follow up.

## 2024-03-04 NOTE — SOCIAL WORK POST DISCHARGE FOLLOW UP NOTE - NSBHSWFOLLOWUP_PSY_ALL_CORE_FT
Writer was informed that pt missed his [facility] appt on 2/28/24. Writer called the pt today ([phone]), states he had a scheduling conflict and it was re-scheduled. Pt then passed the phone to his mother who confirmed the pt is doing well, but issues with his wife are ongoing and she may need to get an  to help, confirms pt is living with her and she re-scheduled the appt for 3/7 at 9:15AM. Writer will continue to follow up to help ensure pt links to outpatient care.

## 2024-03-06 DIAGNOSIS — F30.9 MANIC EPISODE, UNSPECIFIED: ICD-10-CM

## 2024-03-08 NOTE — SOCIAL WORK POST DISCHARGE FOLLOW UP NOTE - NSBHSWFOLLOWUP_PSY_ALL_CORE_FT
Writer was informed pt kept his 3/7/24 [facility] ARS appt. Case to be closed as pt successfully linked to outpatient substance abuse and mental health services.

## 2024-03-18 NOTE — POST DISCHARGE NOTE - NOTIFICATION:
As we discussed in the office visit and after reviewing your most recent blood test your kidney function remains fairly stable for the last 5 years. You have moderate to advanced chronic kidney disease stage IV. I would like to see you back in the office in 6 months with repeat labs. Remember to follow low-salt diet. Remember to avoid NSAIDs (no ibuprofen, Motrin, Advil, Aleve, naproxen). Okay to take Tylenol or acetaminophen as needed for pain. Continue close follow-up with your primary care doctor.
Pt called to request additional letters for disability/unemployment.  Letters provided to pt via email at his request.
Pt called requesting letter indicating that he was admitted to [facility] and that he was stable on discharge, for court appearance regarding temporary order of protection.  Pt requested letter be emailed to him at [email or web address].  Letter provided to pt.

## 2024-04-16 ENCOUNTER — NON-APPOINTMENT (OUTPATIENT)
Age: 49
End: 2024-04-16

## 2024-04-16 ENCOUNTER — APPOINTMENT (OUTPATIENT)
Dept: CARDIOLOGY | Facility: CLINIC | Age: 49
End: 2024-04-16
Payer: MEDICAID

## 2024-04-16 VITALS
BODY MASS INDEX: 26.84 KG/M2 | HEIGHT: 66 IN | SYSTOLIC BLOOD PRESSURE: 114 MMHG | HEART RATE: 64 BPM | OXYGEN SATURATION: 94 % | DIASTOLIC BLOOD PRESSURE: 77 MMHG | WEIGHT: 167 LBS

## 2024-04-16 DIAGNOSIS — Z86.79 PERSONAL HISTORY OF OTHER DISEASES OF THE CIRCULATORY SYSTEM: ICD-10-CM

## 2024-04-16 DIAGNOSIS — Q24.5 MALFORMATION OF CORONARY VESSELS: ICD-10-CM

## 2024-04-16 DIAGNOSIS — Z82.49 FAMILY HISTORY OF ISCHEMIC HEART DISEASE AND OTHER DISEASES OF THE CIRCULATORY SYSTEM: ICD-10-CM

## 2024-04-16 DIAGNOSIS — I10 ESSENTIAL (PRIMARY) HYPERTENSION: ICD-10-CM

## 2024-04-16 DIAGNOSIS — E78.5 HYPERLIPIDEMIA, UNSPECIFIED: ICD-10-CM

## 2024-04-16 DIAGNOSIS — Z86.59 PERSONAL HISTORY OF OTHER MENTAL AND BEHAVIORAL DISORDERS: ICD-10-CM

## 2024-04-16 PROBLEM — Z00.00 ENCOUNTER FOR PREVENTIVE HEALTH EXAMINATION: Status: ACTIVE | Noted: 2024-04-16

## 2024-04-16 PROCEDURE — G2211 COMPLEX E/M VISIT ADD ON: CPT | Mod: NC,1L

## 2024-04-16 PROCEDURE — 93000 ELECTROCARDIOGRAM COMPLETE: CPT

## 2024-04-16 PROCEDURE — 99204 OFFICE O/P NEW MOD 45 MIN: CPT | Mod: 25

## 2024-04-16 RX ORDER — OLANZAPINE 20 MG/1
TABLET, FILM COATED ORAL
Refills: 0 | Status: ACTIVE | COMMUNITY

## 2024-04-16 RX ORDER — NALTREXONE 100 %
POWDER (GRAM) MISCELLANEOUS
Refills: 0 | Status: ACTIVE | COMMUNITY

## 2024-04-16 RX ORDER — METOPROLOL SUCCINATE 50 MG/1
50 TABLET, EXTENDED RELEASE ORAL
Refills: 0 | Status: ACTIVE | COMMUNITY

## 2024-04-16 RX ORDER — CLOPIDOGREL BISULFATE 75 MG/1
75 TABLET, FILM COATED ORAL
Refills: 0 | Status: ACTIVE | COMMUNITY

## 2024-04-16 RX ORDER — LAMOTRIGINE 5 MG/1
5 TABLET, CHEWABLE ORAL
Refills: 0 | Status: ACTIVE | COMMUNITY

## 2024-04-16 NOTE — HISTORY OF PRESENT ILLNESS
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   His father  of cardiac arrest at age 73 Had MI in his mid 40s  He denies any chest pain or SOB He tries to walk around when he feels anxious and restless and feels well when doing so  Sometimes vapes marijuana for his anxiety

## 2024-04-16 NOTE — DISCUSSION/SUMMARY
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 He then underwent stress testing (unknown results) and recommended cardiac surgery at that time.  He presents today for the above. He denies any cardiac symptoms at this time.  He is also currently undergoing medical mgmt for his possible bipolar disorder and feels somewhat anxious and fatigued on his medications.  He has also been prescribed Plavix and a statin as well as a beta blocker. He will continue these for the time being.  A TTE will be done today to evaluate his LV and RV function.   I will obtain his recent blood work from his PCP in addition to all cardiac testing done by his previous Cardiologist. I have referred him to Cardiac surgery and discussed his case with Dr. Hein.   He will follow up with me closely in 3 months.

## 2024-04-22 RX ORDER — ATORVASTATIN CALCIUM 20 MG/1
20 TABLET, FILM COATED ORAL
Qty: 90 | Refills: 0 | Status: ACTIVE | COMMUNITY
Start: 1900-01-01 | End: 1900-01-01

## 2024-04-25 ENCOUNTER — APPOINTMENT (OUTPATIENT)
Dept: CARDIOLOGY | Facility: CLINIC | Age: 49
End: 2024-04-25
Payer: MEDICAID

## 2024-04-25 PROCEDURE — 93306 TTE W/DOPPLER COMPLETE: CPT

## 2024-07-25 ENCOUNTER — NON-APPOINTMENT (OUTPATIENT)
Age: 49
End: 2024-07-25

## 2024-07-25 ENCOUNTER — APPOINTMENT (OUTPATIENT)
Dept: CARDIOLOGY | Facility: CLINIC | Age: 49
End: 2024-07-25
Payer: MEDICAID

## 2024-07-25 VITALS
BODY MASS INDEX: 25.71 KG/M2 | OXYGEN SATURATION: 100 % | HEIGHT: 66 IN | WEIGHT: 160 LBS | HEART RATE: 65 BPM | DIASTOLIC BLOOD PRESSURE: 89 MMHG | SYSTOLIC BLOOD PRESSURE: 164 MMHG

## 2024-07-25 VITALS — SYSTOLIC BLOOD PRESSURE: 134 MMHG | DIASTOLIC BLOOD PRESSURE: 92 MMHG

## 2024-07-25 DIAGNOSIS — I10 ESSENTIAL (PRIMARY) HYPERTENSION: ICD-10-CM

## 2024-07-25 DIAGNOSIS — E78.5 HYPERLIPIDEMIA, UNSPECIFIED: ICD-10-CM

## 2024-07-25 PROCEDURE — 99214 OFFICE O/P EST MOD 30 MIN: CPT | Mod: 25

## 2024-07-25 PROCEDURE — 93000 ELECTROCARDIOGRAM COMPLETE: CPT

## 2024-07-25 PROCEDURE — G2211 COMPLEX E/M VISIT ADD ON: CPT | Mod: NC,1L

## 2024-07-25 RX ORDER — ASPIRIN ENTERIC COATED TABLETS 81 MG 81 MG/1
81 TABLET, DELAYED RELEASE ORAL DAILY
Qty: 90 | Refills: 3 | Status: ACTIVE | COMMUNITY
Start: 2024-07-25 | End: 1900-01-01

## 2024-07-25 NOTE — DISCUSSION/SUMMARY
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 Adan in 2021 of [facility]. Surgical repair was advised. 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 CAC 0. no atherosclerosis. the left coronary artery has an interarterial course between the main PA and the aorta with corresponding elongation of the left main with mild to moderate narrowing. He was prescribed Lipitor 20mg, Metoprolol 50mg XL, ASA and Plavix.  He has been recommended surgery for his anomalous left coronary artery but has persistently pushed it off. He had a NST in 2021 that was reportedly normal (results will be sent over to me). He has a class IIa indication at this time for surgery. He has been referred to Dr. Hein for further mgmt and has an upcoming appt on Tuesday. We will need to obtain his coronary CTA disc,   He will continue ASA and stop Plavix. He will continue low dose statin, LDL c is <100 (no coronary disease noted on CTA). He will continue low dose BB.   To follow up with me closely in 3 months.

## 2024-07-25 NOTE — CARDIOLOGY SUMMARY
[TextEntry] : EC24: NSR, RSR' TTE 2024: CONCLUSIONS: 1. Left ventricular cavity is normal in size. Left ventricular wall thickness is normal. Left ventricular systolic function is normal with an ejection fraction of 68 % by Hendrix's method of disks. 2. Normal left ventricular diastolic function. 3. Normal right ventricular cavity size and normal systolic function. 4. Structurally normal mitral valve with normal leaflet excursion. 5. Trace mitral regurgitation. 6. Trileaflet aortic valve with normal systolic excursion. There is focal calcification of the aortic valve leaflets. 7. Estimated pulmonary artery systolic pressure is 16 mmHg, consistent with normal pulmonary artery pressure.

## 2024-07-25 NOTE — HISTORY OF PRESENT ILLNESS
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  He had a previous Cardiologist in [place] for regular care and for anomalous Left coronary artery and stated he was told he was to have surgery but has not yet done so. He denied any sxs at our last appt.  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 He then underwent stress testing (unknown results) and recommended cardiac surgery at that time. A TTE was done at our last appt, his previous records were requested and [facility] was consulted.   He saw Dr. Adan in  of [facility]. Surgical repair was advised. 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 CAC 0. no atherosclerosis. the left coronary artery has an interarterial course between the main PA and the aorta with corresponding elongation of the left main with mild to moderate narrowing. He was prescribed Lipitor 20mg, Metoprolol 50mg XL, ASA and Plavix.   I called Dr. Toño Gatica to discuss his case. He has been recommending surgery as well.    His father  of cardiac arrest at age 73, MI in his 40s

## 2024-07-30 ENCOUNTER — APPOINTMENT (OUTPATIENT)
Dept: CARDIOTHORACIC SURGERY | Facility: CLINIC | Age: 49
End: 2024-07-30

## 2024-07-30 DIAGNOSIS — Q24.5 MALFORMATION OF CORONARY VESSELS: ICD-10-CM

## 2024-07-30 NOTE — HISTORY OF PRESENT ILLNESS
[FreeTextEntry1] : Neto is a 49 year old male followed and referred by cardiologist Dr. Ana María Calle for evaluation of anomalous LCA. 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   He had established care with Dr. Calle in April of this year. Upon review of notes, he had a previous Cardiologist in [place] for regular care who dx him with anomalous Left coronary artery for which surgery was recommended. 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 He then underwent stress testing? (unknown results) and recommended cardiac surgery at that time. TTE was done in 2024.   He saw Dr. Adan in  of [facility]. Surgical repair was advised however has not had surgery. 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 CAC 0. no atherosclerosis. the left coronary artery has an interarterial course between the main PA and the aorta with corresponding elongation of the left main with mild to moderate narrowing. He was prescribed Lipitor 20mg, Metoprolol 50mg XL, ASA and Plavix.  His father  of cardiac arrest at age 73, MI in his 40s

## 2024-07-30 NOTE — HISTORY OF PRESENT ILLNESS
[FreeTextEntry1] : Neto is a 49 year old male followed and referred by cardiologist Dr. Ana María Calle for evaluation of anomalous LCA. 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   He had established care with Dr. Calle in April of this year. Upon review of notes, he had a previous Cardiologist in [place] for regular care who dx him with anomalous Left coronary artery for which surgery was recommended. 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 He then underwent stress testing? (unknown results) and recommended cardiac surgery at that time. TTE was done in 2024.   He saw Dr. Adan in  of [facility]. Surgical repair was advised however has not had surgery. 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 CAC 0. no atherosclerosis. the left coronary artery has an interarterial course between the main PA and the aorta with corresponding elongation of the left main with mild to moderate narrowing. He was prescribed Lipitor 20mg, Metoprolol 50mg XL, ASA and Plavix.  His father  of cardiac arrest at age 73, MI in his 40s

## 2024-09-28 ENCOUNTER — EMERGENCY (EMERGENCY)
Facility: HOSPITAL | Age: 49
LOS: 1 days | Discharge: HOME | End: 2024-09-28
Attending: STUDENT IN AN ORGANIZED HEALTH CARE EDUCATION/TRAINING PROGRAM | Admitting: STUDENT IN AN ORGANIZED HEALTH CARE EDUCATION/TRAINING PROGRAM
Payer: MEDICAID

## 2024-09-28 VITALS
RESPIRATION RATE: 18 BRPM | HEIGHT: 66 IN | HEART RATE: 81 BPM | WEIGHT: 160.06 LBS | OXYGEN SATURATION: 98 % | TEMPERATURE: 98 F | DIASTOLIC BLOOD PRESSURE: 91 MMHG | SYSTOLIC BLOOD PRESSURE: 127 MMHG

## 2024-09-28 PROCEDURE — 99283 EMERGENCY DEPT VISIT LOW MDM: CPT

## 2024-09-28 PROCEDURE — 96372 THER/PROPH/DIAG INJ SC/IM: CPT

## 2024-09-28 RX ORDER — LORAZEPAM 4 MG/ML
1 INJECTION INTRAMUSCULAR; INTRAVENOUS ONCE
Refills: 0 | Status: DISCONTINUED | OUTPATIENT
Start: 2024-09-28 | End: 2024-09-28

## 2024-09-28 RX ORDER — BENZTROPINE MESYLATE 2 MG/1
2 TABLET ORAL ONCE
Refills: 0 | Status: COMPLETED | OUTPATIENT
Start: 2024-09-28 | End: 2024-09-28

## 2024-09-28 RX ORDER — HYDROXYZINE HCL 25 MG
1 TABLET ORAL
Qty: 14 | Refills: 0
Start: 2024-09-28 | End: 2024-10-11

## 2024-09-28 RX ADMIN — LORAZEPAM 1 MILLIGRAM(S): 4 INJECTION INTRAMUSCULAR; INTRAVENOUS at 14:38

## 2024-09-28 RX ADMIN — BENZTROPINE MESYLATE 2 MILLIGRAM(S): 2 TABLET ORAL at 14:38

## 2024-09-28 NOTE — ED PROVIDER NOTE - PATIENT PORTAL LINK FT
You can access the FollowMyHealth Patient Portal offered by [facility] by registering at the following website: [email or web address]. By joining [employer]’s FollowMyHealth portal, you will also be able to view your health information using other applications (apps) compatible with our system.

## 2024-09-28 NOTE — ED ADULT NURSE NOTE - CHIEF COMPLAINT QUOTE
Pt states "I have Akathisia" pt states he feels restless x 3-4 days. Unable to sit still. Pt states he has bipolar.

## 2024-09-28 NOTE — ED ADULT NURSE NOTE - NS TRANSFER PATIENT BELONGINGS
Clothing Birth Control Pills Counseling: Birth Control Pill Counseling: I discussed with the patient the potential side effects of OCPs including but not limited to increased risk of stroke, heart attack, thrombophlebitis, deep venous thrombosis, hepatic adenomas, breast changes, GI upset, headaches, and depression.  The patient verbalized understanding of the proper use and possible adverse effects of OCPs. All of the patient's questions and concerns were addressed.

## 2024-09-28 NOTE — ED ADULT NURSE NOTE - OBJECTIVE STATEMENT
Pt came in from home with c/o restlessness and trouble sleeping worsening over the last few days. states "I have Akathisia" pt states he feels restless x 3-4 days. states he is Unable to sit still. Pt states he has bipolar. pt is on plavix. pt states he takes olanzapine which causes him to feel like this. states he follows up with his psychologist at [facility]. Denies any HI/SI, hallucintations, drug or alcohol use. pt calm. cooperative on arrival. No 1:1 initiated per MD [place] orders. pt wife at bedside.

## 2024-09-28 NOTE — ED PROVIDER NOTE - CLINICAL SUMMARY MEDICAL DECISION MAKING FREE TEXT BOX
49-year-old male with history of bipolar on lamotrigine and olanzapine presenting to the ED with complaints of reported insomnia for the past 2 to 3 days, patient also reports feeling restless and unable to find position of comfort at rest.  Denies any reported hallucinations denies any SI or HI, reports no other complaints.  Denies any pain.  Patient with likely akathisia secondary to his medications for bipolar disorder, given a dose of ativan and benztropine IM with improvement of symptoms, patient reports improvement in symptoms, will send hydroxyzine for reported insomnia which can help alleviate side effects of akathisia and improve sleep hygiene.  Advised to follow-up with his psychiatrist Dr. Corado, patient verbalized understanding and agreeable discharge plan.  Return precautions

## 2024-10-03 ENCOUNTER — EMERGENCY (EMERGENCY)
Facility: HOSPITAL | Age: 49
LOS: 1 days | Discharge: HOME | End: 2024-10-03
Attending: EMERGENCY MEDICINE | Admitting: EMERGENCY MEDICINE
Payer: MEDICAID

## 2024-10-03 VITALS
WEIGHT: 160.06 LBS | OXYGEN SATURATION: 96 % | HEIGHT: 66 IN | HEART RATE: 80 BPM | RESPIRATION RATE: 16 BRPM | DIASTOLIC BLOOD PRESSURE: 88 MMHG | SYSTOLIC BLOOD PRESSURE: 127 MMHG | TEMPERATURE: 99 F

## 2024-10-03 VITALS
RESPIRATION RATE: 17 BRPM | SYSTOLIC BLOOD PRESSURE: 123 MMHG | HEART RATE: 72 BPM | DIASTOLIC BLOOD PRESSURE: 90 MMHG | OXYGEN SATURATION: 97 %

## 2024-10-03 PROBLEM — F31.9 BIPOLAR DISORDER, UNSPECIFIED: Chronic | Status: ACTIVE | Noted: 2024-09-28

## 2024-10-03 PROBLEM — Q24.9 CONGENITAL MALFORMATION OF HEART, UNSPECIFIED: Chronic | Status: ACTIVE | Noted: 2024-09-28

## 2024-10-03 PROCEDURE — 96372 THER/PROPH/DIAG INJ SC/IM: CPT

## 2024-10-03 PROCEDURE — 99283 EMERGENCY DEPT VISIT LOW MDM: CPT

## 2024-10-03 RX ORDER — LORAZEPAM 4 MG/ML
1 VIAL (ML) INJECTION ONCE
Refills: 0 | Status: DISCONTINUED | OUTPATIENT
Start: 2024-10-03 | End: 2024-10-03

## 2024-10-03 RX ORDER — BENZTROPINE MESYLATE 2 MG
1 TABLET ORAL ONCE
Refills: 0 | Status: COMPLETED | OUTPATIENT
Start: 2024-10-03 | End: 2024-10-03

## 2024-10-03 RX ADMIN — Medication 1 MILLIGRAM(S): at 10:32

## 2024-10-03 RX ADMIN — Medication 1 MILLIGRAM(S): at 10:05

## 2024-10-10 NOTE — ED ADULT NURSE NOTE - ALCOHOL PRE SCREEN (AUDIT - C)
Reason for call:   [x] Refill   [] Prior Auth  [] Other:     Office:   [x] PCP/Provider -   [] Specialty/Provider -     Medication: Losartan     Dose/Frequency: 25 mg    Quantity: 90    Pharmacy: [facility]/pharmacy #[id number] - [place] - [address]      Does the patient have enough for 3 days?   [x] Yes   [] No - Send as HP to POD     Statement Selected

## 2024-10-23 ENCOUNTER — APPOINTMENT (OUTPATIENT)
Dept: CARDIOLOGY | Facility: CLINIC | Age: 49
End: 2024-10-23

## 2024-11-26 ENCOUNTER — APPOINTMENT (OUTPATIENT)
Dept: ENDOCRINOLOGY | Facility: CLINIC | Age: 49
End: 2024-11-26
Payer: MEDICAID

## 2024-11-26 VITALS
HEIGHT: 66 IN | SYSTOLIC BLOOD PRESSURE: 128 MMHG | DIASTOLIC BLOOD PRESSURE: 78 MMHG | WEIGHT: 167 LBS | HEART RATE: 68 BPM | TEMPERATURE: 97 F | RESPIRATION RATE: 16 BRPM | OXYGEN SATURATION: 99 % | BODY MASS INDEX: 26.84 KG/M2

## 2024-11-26 DIAGNOSIS — E04.1 NONTOXIC SINGLE THYROID NODULE: ICD-10-CM

## 2024-11-26 DIAGNOSIS — R79.89 OTHER SPECIFIED ABNORMAL FINDINGS OF BLOOD CHEMISTRY: ICD-10-CM

## 2024-11-26 PROCEDURE — 99204 OFFICE O/P NEW MOD 45 MIN: CPT

## 2024-11-26 RX ORDER — HYDROXYZINE HYDROCHLORIDE 25 MG/1
25 TABLET ORAL
Refills: 0 | Status: ACTIVE | COMMUNITY
Start: 2024-11-26

## 2024-11-26 RX ORDER — LOSARTAN POTASSIUM 25 MG/1
25 TABLET, FILM COATED ORAL
Refills: 0 | Status: ACTIVE | COMMUNITY
Start: 2024-11-26

## 2024-11-26 NOTE — ASSESSMENT
[FreeTextEntry1] : The patient had a normal TSH in April 2024 and a suppressed TSH in Sept 2024 - I ordered labs to evaluate this further.  The patient has right subcentimeter thyroid nodule and a left subcentimeter colloid cyst -  no indication for FNA - next thyroid US due in November 2025.  Plan: 1. Labs to be done today - see below 2. Follow up in 2 weeks to review results - telehealth visit ok

## 2024-11-26 NOTE — HISTORY OF PRESENT ILLNESS
[FreeTextEntry1] : Problems: 1. Thyroid nodules 2. Suppressed TSH 3. 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 Diagnosed in Sept 2024 with a suppressed TSH 2. Labs: 04/04/2024 - TSH N, Free T4 N, Cr N, K N, AST 47 (elevated) ALT 95 (elevated), Trig 111, LDL 91, Glucose 109 09/21/2024 - TSH 0.3 (0.4 to 4.5), Total T3 N 3. Radiology: 2019 - US thyroid - right lobe 5.5 cm and no nodules seen, left lobe 5.8 cm and there is a subcentimeter lower pole cyst October 2024 - US thyroid - Right lobe measures 5.3 cm and left lobe measures 5.4 cm - there is a right posterior midpole subcentimeter thyroid nodule and a left lower pole subcentimeter colloid cyst.  4. No Graves' orbitopathy/dermopathy 5. No family history of thyroid disorders or thyroid cancer, no personal history of radiation treatment 6. Compression symptoms - no SOB on lying flat, no dysphagia 7. Meds: Not on thyroid medications or amiodarone

## 2024-11-26 NOTE — PHYSICAL EXAM
[de-identified] : General: No distress, well nourished Eyes: Normal Sclera, EOMI, PERRL ENT: Normal appearance of the nose, normal oropharynx Neck/Thyroid: No cervical lymphadenopathy, thyroid gland 20 g in size, no thyroid nodules, non-tender Respiratory: No use of accessory muscles of respiration, vesicular breath sounds heard bilaterally, no crepitations or rhonchi Cardiovascular: S1 and S2 heard and normal, no S3 or S4, no murmurs, radial pulse normal bilaterally Abdomen: soft, non-tender, no masses, normal bowel sounds Musculoskeletal: No swelling or deformities of joints of hands, no pedal edema Neurological: Normal range of motion in the hands, Normal brachioradialis reflexes bilaterally Psychiatry: Patient converses normally, good judgement and insight Skin: No rashes in hands, no nodules palpated in hands

## 2024-11-27 LAB
ALBUMIN SERPL ELPH-MCNC: 4.5 G/DL
ALP BLD-CCNC: 75 U/L
ALT SERPL-CCNC: 46 U/L
ANION GAP SERPL CALC-SCNC: 14 MMOL/L
AST SERPL-CCNC: 21 U/L
BILIRUB SERPL-MCNC: 0.4 MG/DL
BUN SERPL-MCNC: 16 MG/DL
CALCIUM SERPL-MCNC: 10 MG/DL
CHLORIDE SERPL-SCNC: 102 MMOL/L
CO2 SERPL-SCNC: 24 MMOL/L
CREAT SERPL-MCNC: 0.88 MG/DL
EGFR: 107 ML/MIN/1.73M2
ERYTHROCYTE [SEDIMENTATION RATE] IN BLOOD BY WESTERGREN METHOD: 25 MM/HR
GLUCOSE SERPL-MCNC: 107 MG/DL
HCT VFR BLD CALC: 49.1 %
HGB BLD-MCNC: 15.5 G/DL
MCHC RBC-ENTMCNC: 24.5 PG
MCHC RBC-ENTMCNC: 31.6 G/DL
MCV RBC AUTO: 77.6 FL
PLATELET # BLD AUTO: 345 K/UL
POTASSIUM SERPL-SCNC: 4.6 MMOL/L
PROT SERPL-MCNC: 7.1 G/DL
RBC # BLD: 6.33 M/UL
RBC # FLD: 14.7 %
SODIUM SERPL-SCNC: 140 MMOL/L
T3 SERPL-MCNC: 112 NG/DL
T4 FREE SERPL-MCNC: 1.1 NG/DL
THYROGLOB AB SERPL-ACNC: <15 IU/ML
THYROGLOB SERPL-MCNC: 7.25 NG/ML
THYROPEROXIDASE AB SERPL IA-ACNC: 10.6 IU/ML
TSH RECEPTOR AB: <1.1 IU/L
TSH SERPL-ACNC: 0.33 UIU/ML
WBC # FLD AUTO: 9.99 K/UL

## 2024-12-01 LAB — TSI ACT/NOR SER: <0.1 IU/L

## 2024-12-10 ENCOUNTER — APPOINTMENT (OUTPATIENT)
Dept: ENDOCRINOLOGY | Facility: CLINIC | Age: 49
End: 2024-12-10
Payer: MEDICAID

## 2024-12-10 DIAGNOSIS — E04.1 NONTOXIC SINGLE THYROID NODULE: ICD-10-CM

## 2024-12-10 DIAGNOSIS — R79.89 OTHER SPECIFIED ABNORMAL FINDINGS OF BLOOD CHEMISTRY: ICD-10-CM

## 2024-12-10 PROCEDURE — 99214 OFFICE O/P EST MOD 30 MIN: CPT

## 2024-12-10 NOTE — ASSESSMENT
[FreeTextEntry1] : The patient had a normal TSH in April 2024 and a suppressed TSH in Sept 2024. In November 2024, his TSH was normal and TSI, TSH-R abs, TPO abs and thyroglobulin abs were negative.  I will monitor his TSH level for now.   The patient has right subcentimeter thyroid nodule and a left subcentimeter colloid cyst -  no indication for FNA - next thyroid US due in November 2025.  Plan: 1. Labs to be done in 3 months - see below 2. Follow up in 3 months to review results - telehealth visit ok

## 2024-12-10 NOTE — HISTORY OF PRESENT ILLNESS
[FreeTextEntry1] : Problems: 1. Thyroid nodules 2. Suppressed TSH 3. 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 Diagnosed in Sept 2024 with a suppressed TSH 2. Labs: 04/04/2024 - TSH N, Free T4 N, Cr N, K N, AST 47 (elevated) ALT 95 (elevated), Trig 111, LDL 91, Glucose 109 09/21/2024 - TSH 0.3 (0.4 to 4.5), Total T3 N 11/26/2024 - TSH N, Free T4 N, Total T3 N, thyroglobulin N, thyroglobulin antibodies neg, TPO antibodies neg, TSH-R abs neg, TSI neg, Hb N, ESR 25 (0-15), Cr N, K N, AST N, ALT 46 (10 to 45) 3. Radiology: 2019 - US thyroid - right lobe 5.5 cm and no nodules seen, left lobe 5.8 cm and there is a subcentimeter lower pole cyst October 2024 - US thyroid - Right lobe measures 5.3 cm and left lobe measures 5.4 cm - there is a right posterior midpole subcentimeter thyroid nodule and a left lower pole subcentimeter colloid cyst.  4. No Graves' orbitopathy/dermopathy 5. No family history of thyroid disorders or thyroid cancer, no personal history of radiation treatment 6. Compression symptoms - no SOB on lying flat, no dysphagia 7. Meds: Not on thyroid medications or amiodarone

## 2024-12-10 NOTE — REASON FOR VISIT
[Other Location: e.g. School (Enter Location, City,State)___] : at [unfilled], at the time of the visit. [Medical Office: ([facility])___] : at the medical office located in  [Patient] : the patient [Follow - Up] : a follow-up visit [Thyroid nodule/ MNG] : thyroid nodule/ MNG

## 2024-12-10 NOTE — PHYSICAL EXAM
[de-identified] : General: No distress, well nourished Eyes: Normal external appearance ENT: Normal appearance of the nose Neck/Thyroid: No visible neck swelling Respiratory: No use of accessory muscles of respiration Psychiatry: Patient converses normally, good judgement and insight Skin: No rashes seen on face

## 2025-03-25 NOTE — ED ADULT TRIAGE NOTE - CHIEF COMPLAINT QUOTE
show Pt states "I have Akathisia" pt states he feels restless x 3-4 days. Unable to sit still. Pt states he has bipolar.